# Patient Record
Sex: FEMALE | Race: BLACK OR AFRICAN AMERICAN | NOT HISPANIC OR LATINO | Employment: UNEMPLOYED | URBAN - METROPOLITAN AREA
[De-identification: names, ages, dates, MRNs, and addresses within clinical notes are randomized per-mention and may not be internally consistent; named-entity substitution may affect disease eponyms.]

---

## 2023-10-03 DIAGNOSIS — I10 CHRONIC HYPERTENSION: Primary | ICD-10-CM

## 2023-10-04 ENCOUNTER — PROCEDURE VISIT (OUTPATIENT)
Dept: MATERNAL FETAL MEDICINE | Facility: CLINIC | Age: 28
End: 2023-10-04
Payer: MEDICAID

## 2023-10-04 ENCOUNTER — OFFICE VISIT (OUTPATIENT)
Dept: MATERNAL FETAL MEDICINE | Facility: CLINIC | Age: 28
End: 2023-10-04
Payer: MEDICAID

## 2023-10-04 VITALS
HEART RATE: 91 BPM | SYSTOLIC BLOOD PRESSURE: 129 MMHG | HEIGHT: 65 IN | WEIGHT: 289 LBS | BODY MASS INDEX: 48.15 KG/M2 | DIASTOLIC BLOOD PRESSURE: 91 MMHG

## 2023-10-04 DIAGNOSIS — O28.3 ABNORMAL FETAL ULTRASOUND: ICD-10-CM

## 2023-10-04 DIAGNOSIS — O34.12 LEIOMYOMA OF UTERUS AFFECTING PREGNANCY IN SECOND TRIMESTER: ICD-10-CM

## 2023-10-04 DIAGNOSIS — O10.919 CHRONIC HYPERTENSION AFFECTING PREGNANCY: Primary | ICD-10-CM

## 2023-10-04 DIAGNOSIS — O09.90 AT HIGH RISK FOR COMPLICATIONS OF INTRAUTERINE PREGNANCY (IUP): ICD-10-CM

## 2023-10-04 DIAGNOSIS — D25.9 LEIOMYOMA OF UTERUS AFFECTING PREGNANCY IN SECOND TRIMESTER: ICD-10-CM

## 2023-10-04 DIAGNOSIS — O99.212 OBESITY AFFECTING PREGNANCY IN SECOND TRIMESTER, UNSPECIFIED OBESITY TYPE: ICD-10-CM

## 2023-10-04 DIAGNOSIS — O44.42 LOW LYING PLACENTA NOS OR WITHOUT HEMORRHAGE, SECOND TRIMESTER: ICD-10-CM

## 2023-10-04 DIAGNOSIS — I10 CHRONIC HYPERTENSION: ICD-10-CM

## 2023-10-04 DIAGNOSIS — O10.012 PRE-EXISTING ESSENTIAL HYPERTENSION AFFECTING PREGNANCY IN SECOND TRIMESTER: ICD-10-CM

## 2023-10-04 PROCEDURE — 3074F PR MOST RECENT SYSTOLIC BLOOD PRESSURE < 130 MM HG: ICD-10-PCS | Mod: CPTII,S$GLB,, | Performed by: OBSTETRICS & GYNECOLOGY

## 2023-10-04 PROCEDURE — 76811 OB US DETAILED SNGL FETUS: CPT | Mod: S$GLB,,, | Performed by: OBSTETRICS & GYNECOLOGY

## 2023-10-04 PROCEDURE — 3008F BODY MASS INDEX DOCD: CPT | Mod: CPTII,S$GLB,, | Performed by: OBSTETRICS & GYNECOLOGY

## 2023-10-04 PROCEDURE — 3080F DIAST BP >= 90 MM HG: CPT | Mod: CPTII,S$GLB,, | Performed by: OBSTETRICS & GYNECOLOGY

## 2023-10-04 PROCEDURE — 76817 PR US, OB, TRANSVAG APPROACH: ICD-10-PCS | Mod: S$GLB,,, | Performed by: OBSTETRICS & GYNECOLOGY

## 2023-10-04 PROCEDURE — 76811 PR US, OB FETAL EVAL & EXAM, TRANSABDOM,FIRST GESTATION: ICD-10-PCS | Mod: S$GLB,,, | Performed by: OBSTETRICS & GYNECOLOGY

## 2023-10-04 PROCEDURE — 1159F MED LIST DOCD IN RCRD: CPT | Mod: CPTII,S$GLB,, | Performed by: OBSTETRICS & GYNECOLOGY

## 2023-10-04 PROCEDURE — 76817 TRANSVAGINAL US OBSTETRIC: CPT | Mod: S$GLB,,, | Performed by: OBSTETRICS & GYNECOLOGY

## 2023-10-04 PROCEDURE — 3008F PR BODY MASS INDEX (BMI) DOCUMENTED: ICD-10-PCS | Mod: CPTII,S$GLB,, | Performed by: OBSTETRICS & GYNECOLOGY

## 2023-10-04 PROCEDURE — 1159F PR MEDICATION LIST DOCUMENTED IN MEDICAL RECORD: ICD-10-PCS | Mod: CPTII,S$GLB,, | Performed by: OBSTETRICS & GYNECOLOGY

## 2023-10-04 PROCEDURE — 3080F PR MOST RECENT DIASTOLIC BLOOD PRESSURE >= 90 MM HG: ICD-10-PCS | Mod: CPTII,S$GLB,, | Performed by: OBSTETRICS & GYNECOLOGY

## 2023-10-04 PROCEDURE — 99204 PR OFFICE/OUTPT VISIT, NEW, LEVL IV, 45-59 MIN: ICD-10-PCS | Mod: TH,25,S$GLB, | Performed by: OBSTETRICS & GYNECOLOGY

## 2023-10-04 PROCEDURE — 99204 OFFICE O/P NEW MOD 45 MIN: CPT | Mod: TH,25,S$GLB, | Performed by: OBSTETRICS & GYNECOLOGY

## 2023-10-04 PROCEDURE — 3074F SYST BP LT 130 MM HG: CPT | Mod: CPTII,S$GLB,, | Performed by: OBSTETRICS & GYNECOLOGY

## 2023-10-04 RX ORDER — NAPROXEN SODIUM 220 MG/1
81 TABLET, FILM COATED ORAL DAILY
COMMUNITY

## 2023-10-04 NOTE — PROGRESS NOTES
Maternal Fetal Medicine New Consult    Subjective     Patient ID: [id number]    Chief Complaint: MFM consult with US (CHTN and Obesity.  Patient is having headaches.)      HPI 28 y.o.  female  at 19w4d gestation with Estimated Date of Delivery: 24 by early US and unknown LMP. She is sent for MFM consultation for chronic hypertension, increased BMI.  She has chronic hypertension, diagnosed earlier in the pregnancy, and is currently on labetalol 200 mg twice daily.  She is on low-dose aspirin daily.  On 2023, she had 24 hour urine with 132 mg protein.  She has increased BMI of 48 at consult visit.  She denies any personal or family history of aneuploidy or anomalies. She denies any exposure to high fevers, viral rashes, illicit drugs or alcohol in this pregnancy.  She denies any leaking fluid, vaginal bleeding, contractions, decreased fetal movement. Denies headaches, visual disturbances, or epigastric pain.  She was accompanied by her mother, Lindsey Peterson.     Pregnancy complications include:   Patient Active Problem List   Diagnosis    Pre-existing essential hypertension affecting pregnancy in second trimester    Abnormal fetal ultrasound (absent nasal bone)    Leiomyoma of uterus affecting pregnancy in second trimester    Obesity affecting pregnancy in second trimester    Low lying placenta nos or without hemorrhage (possible previa), second trimester    At high risk for complications of intrauterine pregnancy (IUP)        Past Medical History:   Diagnosis Date    Hypertension        History reviewed. No pertinent surgical history.    Family History   Problem Relation Age of Onset    Hypertension Maternal Grandmother     Diabetes Maternal Grandmother     Breast cancer Maternal Grandmother     Stroke Maternal Grandfather     Hypertension Father     Diabetes Father     Miscarriages / Stillbirths Mother     Hypertension Mother     Diabetes Mother        Social History     Socioeconomic History     "Marital status: Single   Tobacco Use    Smoking status: Never     Passive exposure: Current    Smokeless tobacco: Never   Substance and Sexual Activity    Alcohol use: Not Currently    Drug use: Never    Sexual activity: Yes     Partners: Male       Current Outpatient Medications   Medication Sig Dispense Refill    aspirin 81 MG Chew Take 81 mg by mouth once daily.      labetalol HCl (INV LABETALOL) 200 MG tablet       prenatal vit/iron fum/folic ac (PRENATAL 1+1 ORAL) Take by mouth.       No current facility-administered medications for this visit.       Review of patient's allergies indicates:  No Known Allergies    Medications:  Current Outpatient Medications   Medication Instructions    aspirin 81 mg, Oral, Daily    labetalol HCl (INV LABETALOL) 200 MG tablet No dose, route, or frequency recorded.    prenatal vit/iron fum/folic ac (PRENATAL 1+1 ORAL) Oral       Review of Systems   12 point review of systems conducted, negative except as stated in the history of present illness. See HPI for details.      Objective     Visit Vitals  BP (!) 129/91 (BP Location: Left arm, Patient Position: Sitting, BP Method: X-Large (Automatic))   Pulse 91   Ht 5' 5" (1.651 m)   Wt 131.1 kg (289 lb)   BMI 48.09 kg/m²        Physical Exam  Vitals and nursing note reviewed. Exam conducted with a chaperone present.   Constitutional:       General: She is not in acute distress.     Appearance: Normal appearance.      Comments: Increased BMI   HENT:      Head: Normocephalic and atraumatic.      Nose: Nose normal. No congestion.      Mouth/Throat:      Pharynx: Oropharynx is clear.   Eyes:      General: No scleral icterus.     Pupils: Pupils are equal, round, and reactive to light.   Cardiovascular:      Rate and Rhythm: Normal rate and regular rhythm.   Pulmonary:      Effort: Pulmonary effort is normal.   Abdominal:      Palpations: Abdomen is soft.      Tenderness: There is no abdominal tenderness. There is no right CVA tenderness, " left CVA tenderness or guarding.      Comments: No CVA tenderness gravid uterus.    Musculoskeletal:         General: Normal range of motion.      Cervical back: Neck supple.      Right lower leg: No edema.      Left lower leg: No edema.   Skin:     General: Skin is warm.      Findings: No bruising or rash.   Neurological:      General: No focal deficit present.      Mental Status: She is oriented to person, place, and time.      Deep Tendon Reflexes: Reflexes normal.      Comments: Normal reflexes   Psychiatric:         Mood and Affect: Mood normal.         Behavior: Behavior normal.         Thought Content: Thought content normal.         Judgment: Judgment normal.         ASSESSMENT/PLAN:     28 y.o.  female with IUP at 19w4d    Chronic hypertension in pregnancy, 2nd trimester  Chronic hypertension complicates up to 2-5% of pregnancies and is associated with significant adverse pregnancy outcomes including  birth, fetal growth restriction, fetal demise, placental abruption, and  delivery. Recent data suggest higher risk of certain congenital anomalies, including, heart defects, hypospadias and esophageal atresia. The incidence of adverse outcomes seen in pregnancies complicated by chronic hypertension is related to the degree and duration of hypertension and to the association of other organ system involvement or damage. Most pregnant women with mild chronic hypertension have uneventful pregnancies with no end-organ involvement. Comparing women who developed superimposed preeclampsia with women who did not, the incidence of  birth was 100% versus 38%, the incidence of small-for-gestational-age (SGA) infants was 78% versus 15%, and the  mortality rate was 48% versus 0%. Besides superimposed preeclampsia or eclampsia, pregnancy complicated by chronic hypertension (especially if severe) may be associated with worsening or malignant hypertension, central nervous system hemorrhage,  "cardiac decompensation, and renal deterioration or failure.    The age of onset, results of previous evaluation, severity and duration of hypertension, and physical examination are important determinants of which clinical tests may be useful. Ideally, a woman with chronic hypertension should be evaluated before pregnancy to ascertain potentially reversible causes and end-organ involvement (eg, heart or kidney). Baseline laboratory tests may include serum creatinine, blood urea nitrogen, electrolytes, CBC and 24-hour urine evaluation for total protein and creatinine clearance. Women who have had hypertension for several years are more likely to have cardiomegaly, ischemic heart disease, renal involvement, and retinopathy. In patients with severe disease over 4 years, or long standing hypertension (typically if over 30 years old), tests which may prove useful in the evaluation of these women include electrocardiography, echocardiography, ophthalmologic examination, and renal ultrasonography.     Women with paroxysmal hypertension, frequent "hypertensive crisis," seizure disorders, or anxiety attacks should be evaluated for pheochromocytoma with measurements of 24-hour urine vanillylmandelic acid, metanephrines, or unconjugated catecholamines. Magnetic resonance imaging after the first trimester or computed tomography may be useful for adrenal tumor localization. Renal artery stenosis appears to be more prevalent in patients with type-2 diabetes and coexistent hypertension. Doppler flow studies or magnetic resonance angiography are helpful to detect renal artery stenosis. Negative results from renal ultrasonography do not exclude renal artery stenosis.     In women with chronic hypertension, it can be difficult to discern worsening hypertension that might occur as a result of physiological changes of pregnancy in the third trimester from the development of preeclampsia. The use of certain laboratory tests such as serum " creatinine, liver functions tests, hematocrit and platelets to compare to baseline values from early in pregnancy might serve to delineate these conditions. Routine screening of women with chronic hypertension with these laboratory tests is not routinely indicated.    I have shared with her the normal natural course of chronic hypertension in pregnancy with improvement early on and likely increasing blood pressure as the pregnancy advances. Based on findings of recent CHAP Study, it is recommended to utilize 140/90 as the threshold for initiation or titration of medical therapy for chronic hypertension in pregnancy, rather than the previously recommended threshold of 160/110. For patients on blood pressure medications at the start of pregnancy, in the absence of mitigating factors or side effects, they can be maintained on their medications, rather than discontinuing them and waiting to initiate treatment for blood pressures in the severe range. Commonly used medications include nifedipine and labetalol.    BP Readings from Last 1 Encounters:   10/04/23 (!) 129/91     With this blood pressure, she was advised to continue low salt diet, and continue labetalol at 200 mg twice daily .  She is also to follow a low sodium diet avoiding any excessive weight gain.     Use aspirin as discussed.    She was advised of the higher risk of fetal growth restriction and superimposed preeclampsia with her underlying chronic hypertension. The risk of placental abruption was also discussed. No prevention is available with her reporting any bleeding or cramping. She was also advised to report any headaches, visual problems, epigastric pain.    There is no consensus as to the most appropriate fetal surveillance test(s) or the interval and timing of testing in  with chronic hypertension. Thus, such testing should be individualized and based on clinical judgment and on severity of disease.    We will plan to do follow-up  ultrasounds to monitor growth in 6 weeks and then every 4 weeks until the end of pregnancy. Recommend fetal testing starting around 32 weeks gestation until the end of pregnancy.     Among patients with uncomplicated chronic hypertension with no additional risk factors, delivery at 38-39 weeks appears to provide optimal balance between the risk of adverse fetal and  outcomes. If no medication and normal fetal assessment, recommend delivery at 39 weeks. However, will reassess closer to EDC to determine optimal timeframe or GA for delivery, based on current evaluation at that time.      Increased BMI in pregnancy, 2nd trimester  Body mass index is 48.09 kg/m².    I discussed with her the risk of first trimester miscarriage, recurrent miscarriages, congenital anomalies, hypertensive disorders, gestational diabetes,  delivery (BMI>35), macrosomia, higher risk of fetal demise in-utero and higher risk of . She was advised of the importance of eating healthy and and limiting weight gain to 11-15 lbs during the pregnancy, as optimal in this situation.  I recommended low calorie, low fat diet avoiding any additional excessive weight gain.  Excess weight gain would be associated with gestational hypertension, gestational diabetes and adverse  outcomes, including fetal demise in utero.    Low dose aspirin as discussed.    It is important to do fetal kick counts to be done on a daily basis after 24 weeks until delivery. With elevated BMI, consider weekly fetal testing until delivery. Earlier fetal testing is needed if any additional risk factors like diabetes, hypertension or even excessive fetal growth.    Preoperative antibiotics and thromboprophylaxis with use if pneumatic compression devices is recommend in those undergoing  delivery. Thromboprophylaxis should also be use with those on prolonged immobilization.    Discussed the importance of losing weight after this pregnancy,  especially before attempting future pregnancy. Breastfeeding may be an important tool in reducing the postpartum weight retention. Fetal risks were discussed with short term risk of fetal/ obesity and long term risk of adolescent component of metabolic syndrome.    Follow a healthy low caloric diet      Leiomyoma in pregnancy, 2nd trimester  Right intramural fibroid measuring 7.6 cm in diameter    Risks of fibroids include  delivery,  PROM, abnormal presentations, higher risk of  sections, placenta previa, fibroid degeneration, poor fetal growth, oligohydramnios, vaginal bleeding and postpartum hemorrhage and in rare circumstances obstruction of lower uterus/cervix, impeding vaginal delivery    Recommend fetal growth ultrasound at 32 weeks (if no other risk factors) for patients with a single large fibroid or multiple fibroids. If concern for degenerating fibroids, can administer short course of NSAID (48-72hrs of Indocin) prior to 32 weeks gestation.     She was instructed to report any increased pain, cramps, vaginal discharge or bleeding. Emphasized the importance of monitoring fetal kick count activity, and reporting immediately if decreased fetal movement occurs.    Document postpartum hemorrhage risk on admission to hospital, ensure T&S sent, and consider type and crossmatch depending on postpartum hemorrhage risk      Abnormal ultrasound, absent nasal bone  Discussed the significance of this finding and higher risk for Down syndrome about 8-fold higher than baseline risk based on maternal age. Patient was offered genetic amniocentesis, after thorough counseling on benefits and risks of procedure, with very remote risk of complications and in some studies no identifiable increased risk, above background risk of spontaneous miscarriage, while some current data suggests risk up to 1 in 800 with early amniocentesis prior to 24 weeks, and remote risk of need for emergency delivery  with all the complications of prematurity with amniocentesis after 24 weeks. She declined amniocentesis . She is aware of the small risk of aneuploidy and need for  evaluation.  Additionally, the limitation of ultrasound in checking for anomalies and the need for  evaluation was emphasized.    She was counseled on Cell free DNA understanding that it is an enhanced screening test but not a diagnostic test. It assesses risk for common aneuploidies such as Trisomy 13, 18, and 21 by evaluating cell-free fetal DNA in maternal circulation with a false positive rate less than 0.5% for Trisomy 21 and less reliable for Trisomy 13 and Trisomy 18. She already had negative cell free DNA.    Patient was advised that in the absence of aneuploidy absent nasal bone could be a familial issue with depressed nasal bridge      At high risk for preeclampsia  With her risk factors for preeclampsia including chronic hypertension , nulliparity, BMI over 30, mother with preeclampsia,  ancestry, and low socioeconomic status, discussed recommendations for low dose aspirin use to decrease risks for adverse outcomes, including preeclampsia, low birth rate and  delivery. Low-dose aspirin reduced the risk for preeclampsia by 24% in clinical trials and reduced the risk for  birth by 14% and FGR by 20%.  After discussing risks/benefits of its use, it was agreed to adjust to asa 81 mg BID, due to multiple risk factors for preeclampsia. After discussing benefits (more effective than daily) vs potential risks (less data on safety with use at delivery), and continue until 34 6/7weeks, then decrease to once daily until delivery.. Also, recommend using in all future pregnancies.      Low lying placenta  Possible low lying placenta versus placenta previa; uncertain due to lower uterine segment contraction.    I discussed with her that most placentas in this situation will move upward as the pregnancy advances and would  not pose problem during the pregnancy. However, advised to be on pelvic rest till we see upward migration of the placenta and expectant management needs to be done. Advised her to report any vaginal bleeding.     Follow up in about 6 weeks (around 11/15/2023) for M follow-up, Repeat ultrasound.     Future Appointments   Date Time Provider Department Center   11/15/2023  8:30 AM Samuel Blackman MD [facility]   11/15/2023  8:30 AM ROOM 1 AND 2, [facility]        Patient was evaluated and examined by Dr. Blackman. ARMINDA Lakhani, helped as  scribe in completion of part of note.    Components of this note were documented using voice recognition systems; and are subject to errors not corrected at proof reading.  Please contact the author for any clarifications.

## 2023-11-14 DIAGNOSIS — O10.919 CHRONIC HYPERTENSION AFFECTING PREGNANCY: Primary | ICD-10-CM

## 2023-11-15 ENCOUNTER — OFFICE VISIT (OUTPATIENT)
Dept: MATERNAL FETAL MEDICINE | Facility: CLINIC | Age: 28
End: 2023-11-15
Payer: MEDICAID

## 2023-11-15 ENCOUNTER — PROCEDURE VISIT (OUTPATIENT)
Dept: MATERNAL FETAL MEDICINE | Facility: CLINIC | Age: 28
End: 2023-11-15
Payer: MEDICAID

## 2023-11-15 VITALS
BODY MASS INDEX: 47.09 KG/M2 | HEART RATE: 99 BPM | HEIGHT: 65 IN | SYSTOLIC BLOOD PRESSURE: 138 MMHG | WEIGHT: 282.63 LBS | DIASTOLIC BLOOD PRESSURE: 84 MMHG

## 2023-11-15 DIAGNOSIS — O99.212 OBESITY AFFECTING PREGNANCY IN SECOND TRIMESTER, UNSPECIFIED OBESITY TYPE: ICD-10-CM

## 2023-11-15 DIAGNOSIS — O10.012 PRE-EXISTING ESSENTIAL HYPERTENSION AFFECTING PREGNANCY IN SECOND TRIMESTER: ICD-10-CM

## 2023-11-15 DIAGNOSIS — O34.12 LEIOMYOMA OF UTERUS AFFECTING PREGNANCY IN SECOND TRIMESTER: ICD-10-CM

## 2023-11-15 DIAGNOSIS — O44.42 LOW LYING PLACENTA NOS OR WITHOUT HEMORRHAGE, SECOND TRIMESTER: ICD-10-CM

## 2023-11-15 DIAGNOSIS — D25.9 LEIOMYOMA OF UTERUS AFFECTING PREGNANCY IN SECOND TRIMESTER: ICD-10-CM

## 2023-11-15 DIAGNOSIS — O09.90 AT HIGH RISK FOR COMPLICATIONS OF INTRAUTERINE PREGNANCY (IUP): ICD-10-CM

## 2023-11-15 DIAGNOSIS — O28.3 ABNORMAL FETAL ULTRASOUND: Primary | ICD-10-CM

## 2023-11-15 DIAGNOSIS — O10.919 CHRONIC HYPERTENSION AFFECTING PREGNANCY: ICD-10-CM

## 2023-11-15 PROCEDURE — 3008F BODY MASS INDEX DOCD: CPT | Mod: CPTII,S$GLB,, | Performed by: OBSTETRICS & GYNECOLOGY

## 2023-11-15 PROCEDURE — 99214 OFFICE O/P EST MOD 30 MIN: CPT | Mod: TH,S$GLB,, | Performed by: OBSTETRICS & GYNECOLOGY

## 2023-11-15 PROCEDURE — 1159F PR MEDICATION LIST DOCUMENTED IN MEDICAL RECORD: ICD-10-PCS | Mod: CPTII,S$GLB,, | Performed by: OBSTETRICS & GYNECOLOGY

## 2023-11-15 PROCEDURE — 1159F MED LIST DOCD IN RCRD: CPT | Mod: CPTII,S$GLB,, | Performed by: OBSTETRICS & GYNECOLOGY

## 2023-11-15 PROCEDURE — 3075F SYST BP GE 130 - 139MM HG: CPT | Mod: CPTII,S$GLB,, | Performed by: OBSTETRICS & GYNECOLOGY

## 2023-11-15 PROCEDURE — 3079F DIAST BP 80-89 MM HG: CPT | Mod: CPTII,S$GLB,, | Performed by: OBSTETRICS & GYNECOLOGY

## 2023-11-15 PROCEDURE — 76816 OB US FOLLOW-UP PER FETUS: CPT | Mod: S$GLB,,, | Performed by: OBSTETRICS & GYNECOLOGY

## 2023-11-15 PROCEDURE — 76817 PR US, OB, TRANSVAG APPROACH: ICD-10-PCS | Mod: S$GLB,,, | Performed by: OBSTETRICS & GYNECOLOGY

## 2023-11-15 PROCEDURE — 99214 PR OFFICE/OUTPT VISIT, EST, LEVL IV, 30-39 MIN: ICD-10-PCS | Mod: TH,S$GLB,, | Performed by: OBSTETRICS & GYNECOLOGY

## 2023-11-15 PROCEDURE — 3079F PR MOST RECENT DIASTOLIC BLOOD PRESSURE 80-89 MM HG: ICD-10-PCS | Mod: CPTII,S$GLB,, | Performed by: OBSTETRICS & GYNECOLOGY

## 2023-11-15 PROCEDURE — 3008F PR BODY MASS INDEX (BMI) DOCUMENTED: ICD-10-PCS | Mod: CPTII,S$GLB,, | Performed by: OBSTETRICS & GYNECOLOGY

## 2023-11-15 PROCEDURE — 76817 TRANSVAGINAL US OBSTETRIC: CPT | Mod: S$GLB,,, | Performed by: OBSTETRICS & GYNECOLOGY

## 2023-11-15 PROCEDURE — 3075F PR MOST RECENT SYSTOLIC BLOOD PRESS GE 130-139MM HG: ICD-10-PCS | Mod: CPTII,S$GLB,, | Performed by: OBSTETRICS & GYNECOLOGY

## 2023-11-15 PROCEDURE — 76816 PR  US,PREGNANT UTERUS,F/U,TRANSABD APP: ICD-10-PCS | Mod: S$GLB,,, | Performed by: OBSTETRICS & GYNECOLOGY

## 2023-11-15 RX ORDER — ALBUTEROL SULFATE 90 UG/1
1 AEROSOL, METERED RESPIRATORY (INHALATION) EVERY 4 HOURS
COMMUNITY
Start: 2023-11-07

## 2023-11-15 RX ORDER — ONDANSETRON 4 MG/1
4 TABLET, FILM COATED ORAL EVERY 6 HOURS PRN
COMMUNITY
Start: 2023-08-14

## 2023-11-15 RX ORDER — ALBUTEROL SULFATE 0.83 MG/ML
SOLUTION RESPIRATORY (INHALATION)
COMMUNITY
Start: 2023-11-07

## 2023-11-15 RX ORDER — LABETALOL 200 MG/1
200 TABLET, FILM COATED ORAL 3 TIMES DAILY
COMMUNITY
Start: 2023-10-26 | End: 2024-01-08 | Stop reason: SDUPTHER

## 2023-11-15 NOTE — PROGRESS NOTES
Maternal Fetal Medicine Follow Up Consult    Subjective     Patient ID: [id number]    Chief Complaint: mfm followup w/us      HPI: Pari Peterson is a 28 y.o. female  at 25w4d gestation with Estimated Date of Delivery: 24  who is here for follow  up consultation by M.  She has chronic hypertension, diagnosed earlier in the pregnancy, and is currently on labetalol 200 mg q12 hours.  She is on low-dose aspirin daily.  On 2023, she had 24 hour urine with 132 mg protein.  She has increased BMI of 48 at consult visit.  She had absent fetal nasal bone seen on consult ultrasound and had negative cell free DNA.  She has fibroid uterus.         Interval history since last M visit: None.. She denies any leaking fluid, vaginal bleeding, contractions, decreased fetal movement. Denies headaches, visual disturbances, or epigastric pain.    Pregnancy complications include:   Patient Active Problem List   Diagnosis    Pre-existing essential hypertension affecting pregnancy in second trimester    Abnormal fetal ultrasound (absent nasal bone)    Leiomyoma of uterus affecting pregnancy in second trimester    Obesity affecting pregnancy in second trimester    Low lying placenta nos or without hemorrhage (possible previa), second trimester    At high risk for complications of intrauterine pregnancy (IUP)        No changes to medical, surgical, family, social, or obstetric history.    Medications:  Current Outpatient Medications   Medication Instructions    albuterol (PROVENTIL) 2.5 mg /3 mL (0.083 %) nebulizer solution SMARTSI Vial(s) Via Nebulizer Every 8 Hours PRN    albuterol (PROVENTIL/VENTOLIN HFA) 90 mcg/actuation inhaler 1 puff, Every 4 hours    aspirin 81 mg, Oral, Daily    labetaloL (NORMODYNE) 200 mg, Oral, 2 times daily    labetalol HCl (INV LABETALOL) 200 MG tablet No dose, route, or frequency recorded.    ondansetron (ZOFRAN) 4 mg, Oral, Every 6 hours PRN    prenatal vit/iron fum/folic ac (PRENATAL 1+1  "ORAL) Oral       Review of Systems   12 point review of systems conducted, negative except as stated in the history of present illness. See HPI for details.      Objective     Visit Vitals  /84 (BP Location: Left arm, Patient Position: Sitting, BP Method: Large (Manual))   Pulse 99   Ht 5' 5" (1.651 m)   Wt 128.2 kg (282 lb 9.6 oz)   BMI 47.03 kg/m²        Physical Exam  Vitals and nursing note reviewed.   Constitutional:       Appearance: Normal appearance.      Comments: Increased BMI   HENT:      Head: Normocephalic and atraumatic.      Nose: Nose normal. No congestion.   Cardiovascular:      Rate and Rhythm: Normal rate.   Pulmonary:      Effort: Pulmonary effort is normal.   Skin:     Findings: No rash.   Neurological:      Mental Status: She is alert and oriented to person, place, and time.   Psychiatric:         Mood and Affect: Mood normal.         Behavior: Behavior normal.         Thought Content: Thought content normal.         Judgment: Judgment normal.           ASSESSMENT/PLAN:     28 y.o.  female with IUP at 25w4d    Chronic hypertension in pregnancy  There is normal fetal growth with an EFW of 749 g at the 31% and the AC at the 19% on 11/15/2023 with normal amniotic fluid volume.    Chronic hypertension complicates up to 2-5% of pregnancies and is associated with significant adverse pregnancy outcomes including  birth, fetal growth restriction, fetal demise, placental abruption, and  delivery.    BP Readings from Last 1 Encounters:   11/15/23 138/84     With this BP, and another reading that was 142/80, she was advised to continue low salt diet, and continue labetalol at 200 mg q12 hours .     Low dose aspirin as discussed.    She would benefit from follow-up ultrasounds to monitor growth with next us in 5 weeks and then every 4 weeks with fetal testing starting around 32 weeks gestation until the end of pregnancy.     Among patients with uncomplicated chronic hypertension " with no additional risk factors, delivery at 38-39 weeks appears to provide optimal balance between the risk of adverse fetal and  outcomes. If no medication and normal fetal assessment, recommend delivery at 39 weeks. However, will reassess closer to EDC to determine optimal timeframe or GA for delivery, based on current evaluation at that time.       Increased BMI in pregnancy, 2nd trimester  Body mass index is 47.03 kg/m². With  7 lb lost since last visit, she was advised to avoid further loss..  Excess weight gain would be associated with gestational hypertension, gestational diabetes and adverse  outcomes, including fetal demise in utero.  Continued weight loss could increase the risk of adverse outcomes.    With risk factors associated with increased BMI, she is to do fetal kick counts throughout the pregnancy (after 24 weeks).    It is important to lose weight after the pregnancy is over, especially before a future pregnancy was discussed. Breastfeeding may be an important tool in reducing the postpartum weight retention. Fetal risks were discussed with short term risk of fetal/ obesity and long term risk of adolescent component of metabolic syndrome.     Leiomyoma in pregnancy  Right intramural fibroid measuring 7.6 cm in diameter on 11/15/2023    Risks of fibroids include  delivery,  PROM, abnormal presentations, higher risk of  sections, placenta previa, fibroid degeneration, poor fetal growth, oligohydramnios, vaginal bleeding and postpartum hemorrhage and in rare circumstances obstruction of lower uterus/cervix, impeding vaginal delivery    Recommend fetal growth ultrasound at 32 weeks (if no other risk factors) for patients with a single large fibroid or multiple fibroids. If concern for degenerating fibroids, can administer short course of NSAID (48-72hrs of Indocin) prior to 32 weeks gestation.     She was instructed to report any increased pain, cramps,  vaginal discharge or bleeding. Emphasized the importance of monitoring fetal kick count activity, and reporting immediately if decreased fetal movement occurs.    Document postpartum hemorrhage risk on admission to hospital, ensure T&S sent, and consider type and crossmatch depending on postpartum hemorrhage risk      Abnormal ultrasound, absent nasal bone  She already had negative cell free DNA. She declined amniocentesis . She is aware of the small risk of aneuploidy and need for  evaluation.  Additionally, the limitation of ultrasound in checking for anomalies and the need for  evaluation was emphasized.    Patient was advised that in the absence of aneuploidy absent nasal bone could be a familial issue with depressed nasal bridge.      At high risk for preeclampsia  With her increased risk for preeclampsia, she agreed to continue asa 81 mg BID until 34 6/7 weeks, then decrease to once daily until delivery.. Preeclampsia precautions reviewed.      Low lying placenta placenta previously seen not seen on 11/15/2023  There is no evidence of previa or low-lying placenta at this time with lower edge of the placenta 2 cm from internal os with no evidence of vasa previa.  Expectant management.           Follow up in about 5 weeks (around 2023) for Repeat  ultrasound, MFM follow-up, BPP.     No future appointments.       MARY involvement: Patient was evaluated and examined by Dr. Blackman. ARMINDA Lakhani, helped in pre charting of part of note.    Components of this note were documented using voice recognition systems and are subject to errors not corrected at proofreading. Please contact the author for any clarifications.

## 2023-12-19 DIAGNOSIS — O99.212 OBESITY AFFECTING PREGNANCY IN SECOND TRIMESTER, UNSPECIFIED OBESITY TYPE: Primary | ICD-10-CM

## 2023-12-20 ENCOUNTER — OFFICE VISIT (OUTPATIENT)
Dept: MATERNAL FETAL MEDICINE | Facility: CLINIC | Age: 28
End: 2023-12-20
Payer: MEDICAID

## 2023-12-20 ENCOUNTER — PROCEDURE VISIT (OUTPATIENT)
Dept: MATERNAL FETAL MEDICINE | Facility: CLINIC | Age: 28
End: 2023-12-20
Payer: MEDICAID

## 2023-12-20 VITALS
SYSTOLIC BLOOD PRESSURE: 140 MMHG | DIASTOLIC BLOOD PRESSURE: 86 MMHG | HEART RATE: 97 BPM | WEIGHT: 288.19 LBS | HEIGHT: 65 IN | BODY MASS INDEX: 48.01 KG/M2

## 2023-12-20 DIAGNOSIS — O10.013 PRE-EXISTING ESSENTIAL HYPERTENSION AFFECTING PREGNANCY IN THIRD TRIMESTER: ICD-10-CM

## 2023-12-20 DIAGNOSIS — O99.213 OBESITY AFFECTING PREGNANCY IN THIRD TRIMESTER, UNSPECIFIED OBESITY TYPE: ICD-10-CM

## 2023-12-20 DIAGNOSIS — O09.90 AT HIGH RISK FOR COMPLICATIONS OF INTRAUTERINE PREGNANCY (IUP): Primary | ICD-10-CM

## 2023-12-20 DIAGNOSIS — O44.42 LOW LYING PLACENTA NOS OR WITHOUT HEMORRHAGE, SECOND TRIMESTER: ICD-10-CM

## 2023-12-20 DIAGNOSIS — D25.9 LEIOMYOMA OF UTERUS AFFECTING PREGNANCY IN THIRD TRIMESTER: ICD-10-CM

## 2023-12-20 DIAGNOSIS — O36.5931 LIGHT FOR GESTATIONAL DATES AFFECTING MANAGEMENT OF MOTHER, THIRD TRIMESTER, FETUS 1: ICD-10-CM

## 2023-12-20 DIAGNOSIS — O99.212 OBESITY AFFECTING PREGNANCY IN SECOND TRIMESTER, UNSPECIFIED OBESITY TYPE: ICD-10-CM

## 2023-12-20 DIAGNOSIS — O34.13 LEIOMYOMA OF UTERUS AFFECTING PREGNANCY IN THIRD TRIMESTER: ICD-10-CM

## 2023-12-20 DIAGNOSIS — O28.3 ABNORMAL FETAL ULTRASOUND: ICD-10-CM

## 2023-12-20 PROCEDURE — 99214 PR OFFICE/OUTPT VISIT, EST, LEVL IV, 30-39 MIN: ICD-10-PCS | Mod: TH,S$GLB,, | Performed by: OBSTETRICS & GYNECOLOGY

## 2023-12-20 PROCEDURE — 76816 PR  US,PREGNANT UTERUS,F/U,TRANSABD APP: ICD-10-PCS | Mod: S$GLB,,, | Performed by: OBSTETRICS & GYNECOLOGY

## 2023-12-20 PROCEDURE — 3077F SYST BP >= 140 MM HG: CPT | Mod: CPTII,S$GLB,, | Performed by: OBSTETRICS & GYNECOLOGY

## 2023-12-20 PROCEDURE — 76820 UMBILICAL ARTERY ECHO: CPT | Mod: S$GLB,,, | Performed by: OBSTETRICS & GYNECOLOGY

## 2023-12-20 PROCEDURE — 3077F PR MOST RECENT SYSTOLIC BLOOD PRESSURE >= 140 MM HG: ICD-10-PCS | Mod: CPTII,S$GLB,, | Performed by: OBSTETRICS & GYNECOLOGY

## 2023-12-20 PROCEDURE — 3008F PR BODY MASS INDEX (BMI) DOCUMENTED: ICD-10-PCS | Mod: CPTII,S$GLB,, | Performed by: OBSTETRICS & GYNECOLOGY

## 2023-12-20 PROCEDURE — 76816 OB US FOLLOW-UP PER FETUS: CPT | Mod: S$GLB,,, | Performed by: OBSTETRICS & GYNECOLOGY

## 2023-12-20 PROCEDURE — 1159F MED LIST DOCD IN RCRD: CPT | Mod: CPTII,S$GLB,, | Performed by: OBSTETRICS & GYNECOLOGY

## 2023-12-20 PROCEDURE — 3008F BODY MASS INDEX DOCD: CPT | Mod: CPTII,S$GLB,, | Performed by: OBSTETRICS & GYNECOLOGY

## 2023-12-20 PROCEDURE — 76819 PR US, OB, FETAL BIOPHYSICAL, W/O NST: ICD-10-PCS | Mod: S$GLB,,, | Performed by: OBSTETRICS & GYNECOLOGY

## 2023-12-20 PROCEDURE — 99214 OFFICE O/P EST MOD 30 MIN: CPT | Mod: TH,S$GLB,, | Performed by: OBSTETRICS & GYNECOLOGY

## 2023-12-20 PROCEDURE — 3079F DIAST BP 80-89 MM HG: CPT | Mod: CPTII,S$GLB,, | Performed by: OBSTETRICS & GYNECOLOGY

## 2023-12-20 PROCEDURE — 1159F PR MEDICATION LIST DOCUMENTED IN MEDICAL RECORD: ICD-10-PCS | Mod: CPTII,S$GLB,, | Performed by: OBSTETRICS & GYNECOLOGY

## 2023-12-20 PROCEDURE — 76819 FETAL BIOPHYS PROFIL W/O NST: CPT | Mod: S$GLB,,, | Performed by: OBSTETRICS & GYNECOLOGY

## 2023-12-20 PROCEDURE — 3079F PR MOST RECENT DIASTOLIC BLOOD PRESSURE 80-89 MM HG: ICD-10-PCS | Mod: CPTII,S$GLB,, | Performed by: OBSTETRICS & GYNECOLOGY

## 2023-12-20 PROCEDURE — 76820 PR US, OB DOPPLER, FETAL UMBILICAL ARTERY ECHO: ICD-10-PCS | Mod: S$GLB,,, | Performed by: OBSTETRICS & GYNECOLOGY

## 2023-12-20 RX ORDER — FERROUS SULFATE TAB 325 MG (65 MG ELEMENTAL FE) 325 (65 FE) MG
TAB ORAL 2 TIMES DAILY
COMMUNITY
Start: 2023-11-28

## 2023-12-20 NOTE — PROGRESS NOTES
Maternal Fetal Medicine Follow Up    Subjective     Patient ID: [id number]    Chief Complaint: MFM FOLLOWUP W/US      HPI: Pari Peterson is a 28 y.o. female  at 30w4d gestation with Estimated Date of Delivery: 24  who is here for follow  up consultation by MFM.    She has chronic hypertension, diagnosed earlier in the pregnancy, and is currently on labetalol 200 mg q12 hours.  She is on low-dose aspirin daily.  On 2023, she had 24 hour urine with 132 mg protein.  She has increased BMI of 48 at consult visit.  She had absent fetal nasal bone seen on consult ultrasound and had negative cell free DNA.  She has fibroid uterus.  She had low-lying placenta seen on outside ultrasound, not seen on follow-up and vasa previa was ruled out.       Interval history since last MFM visit: None.. She denies any leaking fluid, vaginal bleeding, contractions, decreased fetal movement. Denies headaches, visual disturbances, or epigastric pain.    Pregnancy complications include:   Patient Active Problem List   Diagnosis    Pre-existing essential hypertension affecting pregnancy in third trimester    Abnormal fetal ultrasound (absent nasal bone)    Leiomyoma of uterus affecting pregnancy in third trimester    Increased BMI affecting pregnancy in third trimester    At high risk for complications of intrauterine pregnancy (IUP)    Light for gestational dates (EFW 17% on 2023) affecting management of mother, third trimester, fetus 1        No changes to medical, surgical, family, social, or obstetric history.    Medications:  Current Outpatient Medications   Medication Instructions    albuterol (PROVENTIL) 2.5 mg /3 mL (0.083 %) nebulizer solution SMARTSI Vial(s) Via Nebulizer Every 8 Hours PRN    albuterol (PROVENTIL/VENTOLIN HFA) 90 mcg/actuation inhaler 1 puff, Every 4 hours    aspirin 81 mg, Oral, Daily    FEROSUL 325 mg (65 mg iron) Tab tablet Oral, 2 times daily    labetaloL (NORMODYNE) 200 mg, Oral, 2 times  "daily    labetalol HCl (INV LABETALOL) 200 MG tablet No dose, route, or frequency recorded.    ondansetron (ZOFRAN) 4 mg, Oral, Every 6 hours PRN    prenatal vit/iron fum/folic ac (PRENATAL 1+1 ORAL) Oral       Review of Systems   12 point review of systems conducted, negative except as stated in the history of present illness. See HPI for details.      Objective     Visit Vitals  BP (!) 140/86 (BP Location: Right arm, Patient Position: Sitting, BP Method: Large (Manual))   Pulse 97   Ht 5' 5" (1.651 m)   Wt 130.7 kg (288 lb 3.2 oz)   BMI 47.96 kg/m²        Physical Exam  Vitals and nursing note reviewed.   Constitutional:       Appearance: Normal appearance.      Comments: Increased BMI   HENT:      Head: Normocephalic and atraumatic.      Nose: Nose normal. No congestion.   Cardiovascular:      Rate and Rhythm: Normal rate.   Pulmonary:      Effort: Pulmonary effort is normal.   Skin:     Findings: No rash.   Neurological:      Mental Status: She is alert and oriented to person, place, and time.   Psychiatric:         Mood and Affect: Mood normal.         Behavior: Behavior normal.         Thought Content: Thought content normal.         Judgment: Judgment normal.           ASSESSMENT/PLAN:     28 y.o.  female with IUP at 30w4d    Chronic hypertension in pregnancy with low-normal fetal growth  There is low normal fetal growth with an EFW of 1350 g at the 17% and the AC at the 19%. A limited repeat fetal anatomic survey shows no abnormalities of the structures that were adequately imaged.   AFV is normal.     The BPP score is reassuring at 8/8, and the MVP is normal.    with estimated fetal weight at 17th percentile with possible more significant fetal growth restriction in view of range of error of ultrasound, umbilical artery Doppler was done and was normal with S/D ratio of 2.85    Chronic hypertension complicates up to 2-5% of pregnancies and is associated with significant adverse pregnancy outcomes " including  birth, fetal growth restriction, fetal demise, placental abruption, and  delivery.    BP Readings from Last 1 Encounters:   23 (!) 140/86     With this BP and another reading at 158/100,, she was advised to continue low salt diet, and adjust labetalol to 200 mg q.8 hours .     Low dose aspirin as discussed.    With low-normal fetal growth, we will plan to recheck growth in 3 weeks and start twice weekly fetal testing with chronic hypertension on medication and BMI over 45.  Discussed with Dr. Bolviar who will be seeing the patient early in the week for an NST and will see her later in the week on .        Increased BMI in pregnancy  Body mass index is 47.96 kg/m². With relatively stable weight recently, she was advised to continue healthy low caloric and low salt diet.  Excess weight gain would be associated with gestational hypertension, gestational diabetes and adverse  outcomes, including fetal demise in utero.    With risk factors associated with increased BMI, she is to do fetal kick counts throughout the pregnancy (after 24 weeks).    It is important to lose weight after the pregnancy is over, especially before a future pregnancy was discussed. Breastfeeding may be an important tool in reducing the postpartum weight retention. Fetal risks were discussed with short term risk of fetal/ obesity and long term risk of adolescent component of metabolic syndrome.       Leiomyoma in pregnancy  Right intramural fibroid measuring  6.9 cm in diameter on 2023    Risks of fibroids include  delivery,  PROM, abnormal presentations, higher risk of  sections, placenta previa, fibroid degeneration, poor fetal growth, oligohydramnios, vaginal bleeding and postpartum hemorrhage and in rare circumstances obstruction of lower uterus/cervix, impeding vaginal delivery    If concern for degenerating fibroids, can administer short course of NSAID  (48-72hrs of Indocin) prior to 32 weeks gestation.     She was instructed to report any increased pain, cramps, vaginal discharge or bleeding. Emphasized the importance of monitoring fetal kick count activity, and reporting immediately if decreased fetal movement occurs.    Document postpartum hemorrhage risk on admission to hospital, ensure T&S sent, and consider type and crossmatch depending on postpartum hemorrhage risk      Absent nasal bone  She already had negative cell free DNA. She declined amniocentesis . She is aware of the small risk of aneuploidy and need for  evaluation.  Additionally, the limitation of ultrasound in checking for anomalies and the need for  evaluation was emphasized.    Patient was advised that in the absence of aneuploidy absent nasal bone could be a familial issue with depressed nasal bridge.      At high risk for preeclampsia  With her increased risk for preeclampsia, she agreed to continue asa 81 mg BID until 34 6/7 weeks, then decrease to once daily until delivery.. Preeclampsia precautions reviewed.      No orders of the defined types were placed in this encounter.       Follow up in about 1 week (around 2023) for Thursday, BPP, MFM follow-up.     No future appointments.       MARY involvement: Patient was evaluated by ARMINDA Lakhani and Dr. Blackman.  Final assessment and recommendations as stated above were made by Dr. Blackman.    Components of this note were documented using voice recognition systems and are subject to errors not corrected at proofreading. Please contact the author for any clarifications.

## 2023-12-21 DIAGNOSIS — O34.13 LEIOMYOMA OF UTERUS AFFECTING PREGNANCY IN THIRD TRIMESTER: ICD-10-CM

## 2023-12-21 DIAGNOSIS — O28.3 ABNORMAL FETAL ULTRASOUND: ICD-10-CM

## 2023-12-21 DIAGNOSIS — O09.90 AT HIGH RISK FOR COMPLICATIONS OF INTRAUTERINE PREGNANCY (IUP): Primary | ICD-10-CM

## 2023-12-21 DIAGNOSIS — O99.213 OBESITY AFFECTING PREGNANCY IN THIRD TRIMESTER, UNSPECIFIED OBESITY TYPE: ICD-10-CM

## 2023-12-21 DIAGNOSIS — O10.013 PRE-EXISTING ESSENTIAL HYPERTENSION AFFECTING PREGNANCY IN THIRD TRIMESTER: ICD-10-CM

## 2023-12-21 DIAGNOSIS — D25.9 LEIOMYOMA OF UTERUS AFFECTING PREGNANCY IN THIRD TRIMESTER: ICD-10-CM

## 2023-12-27 ENCOUNTER — OFFICE VISIT (OUTPATIENT)
Dept: MATERNAL FETAL MEDICINE | Facility: CLINIC | Age: 28
End: 2023-12-27
Payer: MEDICAID

## 2023-12-27 ENCOUNTER — PROCEDURE VISIT (OUTPATIENT)
Dept: MATERNAL FETAL MEDICINE | Facility: CLINIC | Age: 28
End: 2023-12-27
Payer: MEDICAID

## 2023-12-27 VITALS
DIASTOLIC BLOOD PRESSURE: 85 MMHG | BODY MASS INDEX: 48.32 KG/M2 | HEIGHT: 65 IN | WEIGHT: 290 LBS | HEART RATE: 93 BPM | SYSTOLIC BLOOD PRESSURE: 143 MMHG

## 2023-12-27 DIAGNOSIS — O34.13 LEIOMYOMA OF UTERUS AFFECTING PREGNANCY IN THIRD TRIMESTER: ICD-10-CM

## 2023-12-27 DIAGNOSIS — D25.9 LEIOMYOMA OF UTERUS AFFECTING PREGNANCY IN THIRD TRIMESTER: ICD-10-CM

## 2023-12-27 DIAGNOSIS — O09.90 AT HIGH RISK FOR COMPLICATIONS OF INTRAUTERINE PREGNANCY (IUP): Primary | ICD-10-CM

## 2023-12-27 DIAGNOSIS — O28.3 ABNORMAL FETAL ULTRASOUND: ICD-10-CM

## 2023-12-27 DIAGNOSIS — O10.013 PRE-EXISTING ESSENTIAL HYPERTENSION AFFECTING PREGNANCY IN THIRD TRIMESTER: ICD-10-CM

## 2023-12-27 DIAGNOSIS — E66.01 MORBID OBESITY: ICD-10-CM

## 2023-12-27 DIAGNOSIS — O99.213 OBESITY AFFECTING PREGNANCY IN THIRD TRIMESTER, UNSPECIFIED OBESITY TYPE: ICD-10-CM

## 2023-12-27 DIAGNOSIS — O36.5931 LIGHT FOR GESTATIONAL DATES AFFECTING MANAGEMENT OF MOTHER, THIRD TRIMESTER, FETUS 1: ICD-10-CM

## 2023-12-27 DIAGNOSIS — O09.90 AT HIGH RISK FOR COMPLICATIONS OF INTRAUTERINE PREGNANCY (IUP): ICD-10-CM

## 2023-12-27 PROCEDURE — 3079F DIAST BP 80-89 MM HG: CPT | Mod: CPTII,S$GLB,, | Performed by: OBSTETRICS & GYNECOLOGY

## 2023-12-27 PROCEDURE — 1159F PR MEDICATION LIST DOCUMENTED IN MEDICAL RECORD: ICD-10-PCS | Mod: CPTII,S$GLB,, | Performed by: OBSTETRICS & GYNECOLOGY

## 2023-12-27 PROCEDURE — 99214 PR OFFICE/OUTPT VISIT, EST, LEVL IV, 30-39 MIN: ICD-10-PCS | Mod: TH,S$GLB,, | Performed by: OBSTETRICS & GYNECOLOGY

## 2023-12-27 PROCEDURE — 76819 FETAL BIOPHYS PROFIL W/O NST: CPT | Mod: S$GLB,,, | Performed by: OBSTETRICS & GYNECOLOGY

## 2023-12-27 PROCEDURE — 3079F PR MOST RECENT DIASTOLIC BLOOD PRESSURE 80-89 MM HG: ICD-10-PCS | Mod: CPTII,S$GLB,, | Performed by: OBSTETRICS & GYNECOLOGY

## 2023-12-27 PROCEDURE — 76819 PR US, OB, FETAL BIOPHYSICAL, W/O NST: ICD-10-PCS | Mod: S$GLB,,, | Performed by: OBSTETRICS & GYNECOLOGY

## 2023-12-27 PROCEDURE — 99214 OFFICE O/P EST MOD 30 MIN: CPT | Mod: TH,S$GLB,, | Performed by: OBSTETRICS & GYNECOLOGY

## 2023-12-27 PROCEDURE — 3008F BODY MASS INDEX DOCD: CPT | Mod: CPTII,S$GLB,, | Performed by: OBSTETRICS & GYNECOLOGY

## 2023-12-27 PROCEDURE — 3077F PR MOST RECENT SYSTOLIC BLOOD PRESSURE >= 140 MM HG: ICD-10-PCS | Mod: CPTII,S$GLB,, | Performed by: OBSTETRICS & GYNECOLOGY

## 2023-12-27 PROCEDURE — 3077F SYST BP >= 140 MM HG: CPT | Mod: CPTII,S$GLB,, | Performed by: OBSTETRICS & GYNECOLOGY

## 2023-12-27 PROCEDURE — 3008F PR BODY MASS INDEX (BMI) DOCUMENTED: ICD-10-PCS | Mod: CPTII,S$GLB,, | Performed by: OBSTETRICS & GYNECOLOGY

## 2023-12-27 PROCEDURE — 1159F MED LIST DOCD IN RCRD: CPT | Mod: CPTII,S$GLB,, | Performed by: OBSTETRICS & GYNECOLOGY

## 2023-12-27 NOTE — PROGRESS NOTES
Maternal Fetal Medicine Follow Up    Subjective     Patient ID: [id number]    Chief Complaint: MFM follow up with US (Patient is having headaches.)      HPI: Pari Peterson is a 28 y.o. female  at 31w4d gestation with Estimated Date of Delivery: 24  who is here for follow  up consultation by MFM.    She has chronic hypertension, diagnosed earlier in the pregnancy, and is currently on labetalol 200 mg q8 hours.  She is on low-dose aspirin daily.  On 2023, she had 24 hour urine with 132 mg protein.  She has increased BMI of 48 at consult visit.  She had absent fetal nasal bone seen on consult ultrasound and had negative cell free DNA.  She has fibroid uterus.  She had low-lying placenta seen on outside ultrasound, not seen on follow-up and vasa previa was ruled out.       Interval history since last MFM visit: None.. She denies any leaking fluid, vaginal bleeding, contractions, decreased fetal movement. Denies headaches, visual disturbances, or epigastric pain.    Pregnancy complications include:   Patient Active Problem List   Diagnosis    Pre-existing essential hypertension affecting pregnancy in third trimester    Abnormal fetal ultrasound (absent nasal bone)    Leiomyoma of uterus affecting pregnancy in third trimester    Increased BMI affecting pregnancy in third trimester    At high risk for complications of intrauterine pregnancy (IUP)    Light for gestational dates (EFW 17% on 2023) affecting management of mother, third trimester, fetus 1    BMI over 45        No changes to medical, surgical, family, social, or obstetric history.    Medications:  Current Outpatient Medications   Medication Instructions    albuterol (PROVENTIL) 2.5 mg /3 mL (0.083 %) nebulizer solution SMARTSI Vial(s) Via Nebulizer Every 8 Hours PRN    albuterol (PROVENTIL/VENTOLIN HFA) 90 mcg/actuation inhaler 1 puff, Every 4 hours    aspirin 81 mg, Oral, Daily    FEROSUL 325 mg (65 mg iron) Tab tablet Oral, 2 times  "daily    labetaloL (NORMODYNE) 200 mg, Oral, 3 times daily    labetalol HCl (INV LABETALOL) 200 MG tablet No dose, route, or frequency recorded.    ondansetron (ZOFRAN) 4 mg, Oral, Every 6 hours PRN    prenatal vit/iron fum/folic ac (PRENATAL 1+1 ORAL) Oral       Review of Systems   12 point review of systems conducted, negative except as stated in the history of present illness. See HPI for details.      Objective     Visit Vitals  BP (!) 143/85 (BP Location: Right arm, Patient Position: Sitting, BP Method: X-Large (Automatic))   Pulse 93   Ht 5' 5" (1.651 m)   Wt 131.5 kg (290 lb)   BMI 48.26 kg/m²          Physical Exam  Vitals and nursing note reviewed.   Constitutional:       Appearance: Normal appearance.      Comments: Increased BMI   HENT:      Head: Normocephalic and atraumatic.      Nose: Nose normal. No congestion.   Cardiovascular:      Rate and Rhythm: Normal rate.   Pulmonary:      Effort: Pulmonary effort is normal.   Skin:     Findings: No rash.   Neurological:      Mental Status: She is alert and oriented to person, place, and time.   Psychiatric:         Mood and Affect: Mood normal.         Behavior: Behavior normal.         Thought Content: Thought content normal.         Judgment: Judgment normal.           ASSESSMENT/PLAN:     28 y.o.  female with IUP at 31w4d    Chronic hypertension in pregnancy with low-normal fetal growth  There is low normal fetal growth with an EFW of 1350 g at the 17% and the AC at the 19% on 2023.  AFV is normal.  BPP 8/8.  With estimated fetal weight at 17th percentile with possible more significant fetal growth restriction in view of range of error of ultrasound, umbilical artery Doppler was done and was normal.    Chronic hypertension complicates up to 2-5% of pregnancies and is associated with significant adverse pregnancy outcomes including  birth, fetal growth restriction, fetal demise, placental abruption, and  delivery.    BP Readings " from Last 1 Encounters:   23 (!) 143/85     With this BP, with a another reading that was 155/103 she was advised to continue low salt diet, and adjust labetalol to 300 mg q.8 hours .     Patient will go tomorrow to Dr. Gallardo's office and have her blood pressure rechecked.    Low dose aspirin as discussed.    With low-normal fetal growth, we will plan to recheck growth 3 weeks from the last check and continue twice weekly fetal testing, alternating with primary Ob until delivery.  Dr. Gallardo will be seeing the patient early in the week for an NST and will see her later in the week on .        Increased BMI in pregnancy  Body mass index is 48.26 kg/m². With relatively stable weight recently, she was advised to continue healthy low caloric and low salt diet.  Excess weight gain would be associated with gestational hypertension, gestational diabetes and adverse  outcomes, including fetal demise in utero.    With risk factors associated with increased BMI, she is to do fetal kick counts throughout the pregnancy (after 24 weeks).    It is important to lose weight after the pregnancy is over, especially before a future pregnancy was discussed. Breastfeeding may be an important tool in reducing the postpartum weight retention. Fetal risks were discussed with short term risk of fetal/ obesity and long term risk of adolescent component of metabolic syndrome.       Leiomyoma in pregnancy  Right intramural fibroid measuring 6.9 cm in diameter on 2023    Risks of fibroids include  delivery,  PROM, abnormal presentations, higher risk of  sections, placenta previa, fibroid degeneration, poor fetal growth, oligohydramnios, vaginal bleeding and postpartum hemorrhage and in rare circumstances obstruction of lower uterus/cervix, impeding vaginal delivery    If concern for degenerating fibroids, can administer short course of NSAID (48-72hrs of Indocin) prior to 32 weeks  gestation.     She was instructed to report any increased pain, cramps, vaginal discharge or bleeding. Emphasized the importance of monitoring fetal kick count activity, and reporting immediately if decreased fetal movement occurs.    Document postpartum hemorrhage risk on admission to hospital, ensure T&S sent, and consider type and crossmatch depending on postpartum hemorrhage risk      Absent nasal bone  She already had negative cell free DNA. She declined amniocentesis . She is aware of the small risk of aneuploidy and need for  evaluation.  Additionally, the limitation of ultrasound in checking for anomalies and the need for  evaluation was emphasized.    Patient was advised that in the absence of aneuploidy absent nasal bone could be a familial issue with depressed nasal bridge.      At high risk for preeclampsia  With her increased risk for preeclampsia, she agreed to continue asa 81 mg BID until 34 6/7 weeks, then decrease to once daily until delivery.. Preeclampsia precautions reviewed.    Discussed with Dr. Gallardo.  Patient will see Dr. Gallardo tomorrow, we will go for an NST at the hospital on  for a blood pressure check and we will see her back in 1 week here.  Fetal kick counts and preeclampsia warnings given.    Follow up in about 1 week (around 1/3/2024) for BPP, MFM follow-up.     No future appointments.      MARY involvement: Patient was evaluated by ARMINDA Lakhani and Dr. Blackman.  Final assessment and recommendations as stated above were made by Dr. Blackman.    Components of this note were documented using voice recognition systems and are subject to errors not corrected at proofreading. Please contact the author for any clarifications.

## 2024-01-02 DIAGNOSIS — O10.013 PRE-EXISTING ESSENTIAL HYPERTENSION AFFECTING PREGNANCY IN THIRD TRIMESTER: ICD-10-CM

## 2024-01-02 DIAGNOSIS — O99.213 OBESITY AFFECTING PREGNANCY IN THIRD TRIMESTER, UNSPECIFIED OBESITY TYPE: ICD-10-CM

## 2024-01-02 DIAGNOSIS — O28.3 ABNORMAL FETAL ULTRASOUND: ICD-10-CM

## 2024-01-02 DIAGNOSIS — D25.9 LEIOMYOMA OF UTERUS AFFECTING PREGNANCY IN THIRD TRIMESTER: ICD-10-CM

## 2024-01-02 DIAGNOSIS — O34.13 LEIOMYOMA OF UTERUS AFFECTING PREGNANCY IN THIRD TRIMESTER: ICD-10-CM

## 2024-01-02 DIAGNOSIS — O09.90 AT HIGH RISK FOR COMPLICATIONS OF INTRAUTERINE PREGNANCY (IUP): Primary | ICD-10-CM

## 2024-01-03 ENCOUNTER — PROCEDURE VISIT (OUTPATIENT)
Dept: MATERNAL FETAL MEDICINE | Facility: CLINIC | Age: 29
End: 2024-01-03
Payer: MEDICAID

## 2024-01-03 ENCOUNTER — OFFICE VISIT (OUTPATIENT)
Dept: MATERNAL FETAL MEDICINE | Facility: CLINIC | Age: 29
End: 2024-01-03
Payer: MEDICAID

## 2024-01-03 VITALS
DIASTOLIC BLOOD PRESSURE: 75 MMHG | SYSTOLIC BLOOD PRESSURE: 115 MMHG | HEART RATE: 91 BPM | WEIGHT: 293 LBS | BODY MASS INDEX: 48.82 KG/M2 | HEIGHT: 65 IN

## 2024-01-03 DIAGNOSIS — O10.013 PRE-EXISTING ESSENTIAL HYPERTENSION AFFECTING PREGNANCY IN THIRD TRIMESTER: ICD-10-CM

## 2024-01-03 DIAGNOSIS — O09.90 AT HIGH RISK FOR COMPLICATIONS OF INTRAUTERINE PREGNANCY (IUP): ICD-10-CM

## 2024-01-03 DIAGNOSIS — E66.01 MORBID OBESITY: ICD-10-CM

## 2024-01-03 DIAGNOSIS — O10.919 CHRONIC HYPERTENSION AFFECTING PREGNANCY: Primary | ICD-10-CM

## 2024-01-03 DIAGNOSIS — O28.3 ABNORMAL FETAL ULTRASOUND: ICD-10-CM

## 2024-01-03 DIAGNOSIS — O09.90 AT HIGH RISK FOR COMPLICATIONS OF INTRAUTERINE PREGNANCY (IUP): Primary | ICD-10-CM

## 2024-01-03 DIAGNOSIS — D25.9 LEIOMYOMA OF UTERUS AFFECTING PREGNANCY IN THIRD TRIMESTER: ICD-10-CM

## 2024-01-03 DIAGNOSIS — O34.13 LEIOMYOMA OF UTERUS AFFECTING PREGNANCY IN THIRD TRIMESTER: ICD-10-CM

## 2024-01-03 DIAGNOSIS — O36.5931 LIGHT FOR GESTATIONAL DATES AFFECTING MANAGEMENT OF MOTHER, THIRD TRIMESTER, FETUS 1: ICD-10-CM

## 2024-01-03 DIAGNOSIS — O99.213 OBESITY AFFECTING PREGNANCY IN THIRD TRIMESTER, UNSPECIFIED OBESITY TYPE: ICD-10-CM

## 2024-01-03 PROCEDURE — 3008F BODY MASS INDEX DOCD: CPT | Mod: CPTII,S$GLB,, | Performed by: OBSTETRICS & GYNECOLOGY

## 2024-01-03 PROCEDURE — 3078F DIAST BP <80 MM HG: CPT | Mod: CPTII,S$GLB,, | Performed by: OBSTETRICS & GYNECOLOGY

## 2024-01-03 PROCEDURE — 3074F SYST BP LT 130 MM HG: CPT | Mod: CPTII,S$GLB,, | Performed by: OBSTETRICS & GYNECOLOGY

## 2024-01-03 PROCEDURE — 1159F MED LIST DOCD IN RCRD: CPT | Mod: CPTII,S$GLB,, | Performed by: OBSTETRICS & GYNECOLOGY

## 2024-01-03 PROCEDURE — 76819 FETAL BIOPHYS PROFIL W/O NST: CPT | Mod: S$GLB,,, | Performed by: OBSTETRICS & GYNECOLOGY

## 2024-01-03 PROCEDURE — 99213 OFFICE O/P EST LOW 20 MIN: CPT | Mod: TH,S$GLB,, | Performed by: OBSTETRICS & GYNECOLOGY

## 2024-01-03 NOTE — PROGRESS NOTES
Maternal Fetal Medicine Follow Up    Subjective     Patient ID: [id number]    Chief Complaint: MFM follow up with US      HPI: Pari Peterson is a 28 y.o. female  at 32w4d gestation with Estimated Date of Delivery: 24  who is here for follow  up consultation by MFM.    She has chronic hypertension, diagnosed earlier in the pregnancy, and is currently on labetalol 300 mg q8 hours.  She is on low-dose aspirin daily.  On 2023, she had 24 hour urine with 132 mg protein.  She has increased BMI of 48 at consult visit.  She had absent fetal nasal bone seen on consult ultrasound and had negative cell free DNA.  She has fibroid uterus.  She had low-lying placenta seen on outside ultrasound, not seen on follow-up and vasa previa was ruled out.  She had low normal fetal growth on previous ultrasound.       Interval history since last MFM visit: None.. She denies any leaking fluid, vaginal bleeding, contractions, decreased fetal movement. Denies headaches, visual disturbances, or epigastric pain.    Pregnancy complications include:   Patient Active Problem List   Diagnosis    Pre-existing essential hypertension affecting pregnancy in third trimester    Abnormal fetal ultrasound (absent nasal bone)    Leiomyoma of uterus affecting pregnancy in third trimester    Increased BMI affecting pregnancy in third trimester    At high risk for complications of intrauterine pregnancy (IUP)    Light for gestational dates (EFW 17% on 2023) affecting management of mother, third trimester, fetus 1    BMI over 45        No changes to medical, surgical, family, social, or obstetric history.    Medications:  Current Outpatient Medications   Medication Instructions    albuterol (PROVENTIL) 2.5 mg /3 mL (0.083 %) nebulizer solution SMARTSI Vial(s) Via Nebulizer Every 8 Hours PRN    albuterol (PROVENTIL/VENTOLIN HFA) 90 mcg/actuation inhaler 1 puff, Every 4 hours    aspirin 81 mg, Oral, Daily    FEROSUL 325 mg (65 mg iron) Tab  "tablet Oral, 2 times daily    labetaloL (NORMODYNE) 200 mg, Oral, 3 times daily    labetalol HCl (INV LABETALOL) 200 MG tablet No dose, route, or frequency recorded.    ondansetron (ZOFRAN) 4 mg, Oral, Every 6 hours PRN    prenatal vit/iron fum/folic ac (PRENATAL 1+1 ORAL) Oral       Review of Systems   12 point review of systems conducted, negative except as stated in the history of present illness. See HPI for details.      Objective     Visit Vitals  /75 (BP Location: Right arm, Patient Position: Sitting, BP Method: X-Large (Automatic))   Pulse 91   Ht 5' 5" (1.651 m)   Wt 133.8 kg (295 lb)   BMI 49.09 kg/m²         Physical Exam  Vitals and nursing note reviewed.   Constitutional:       Appearance: Normal appearance.      Comments: Increased BMI   HENT:      Head: Normocephalic and atraumatic.      Nose: Nose normal. No congestion.   Cardiovascular:      Rate and Rhythm: Normal rate.   Pulmonary:      Effort: Pulmonary effort is normal.   Skin:     Findings: No rash.   Neurological:      Mental Status: She is alert and oriented to person, place, and time.   Psychiatric:         Mood and Affect: Mood normal.         Behavior: Behavior normal.         Thought Content: Thought content normal.         Judgment: Judgment normal.           ASSESSMENT/PLAN:     28 y.o.  female with IUP at 32w4d    Chronic hypertension in pregnancy with low-normal fetal growth  There is low normal fetal growth with an EFW of 1350 g at the 17% and the AC at the 19% on 2023.  AFV is normal.  BPP 8/8.  With estimated fetal weight at 17th percentile with possible more significant fetal growth restriction in view of range of error of ultrasound, umbilical artery Doppler was done and was normal.    Chronic hypertension complicates up to 2-5% of pregnancies and is associated with significant adverse pregnancy outcomes including  birth, fetal growth restriction, fetal demise, placental abruption, and  " delivery.    BP Readings from Last 1 Encounters:   24 115/75     With this BP, with a another reading that [id number] she was advised to continue low salt diet, and start labetalol at 300 mg q8 hours .     Low dose aspirin as discussed.    With low-normal fetal growth, we will plan to recheck fetal growth in 1 week and continue twice weekly fetal testing, alternating with primary OB until delivery.  Dr. Gallardo will be seeing the patient early in the week for an NST and will see her later in the week on .        Increased BMI in pregnancy  Body mass index is 49.09 kg/m². With relatively stable weight recently, she was advised to continue healthy low caloric and low salt diet.  Excess weight gain would be associated with gestational hypertension, gestational diabetes and adverse  outcomes, including fetal demise in utero.    With risk factors associated with increased BMI, she is to do fetal kick counts throughout the pregnancy (after 24 weeks).    It is important to lose weight after the pregnancy is over, especially before a future pregnancy was discussed. Breastfeeding may be an important tool in reducing the postpartum weight retention. Fetal risks were discussed with short term risk of fetal/ obesity and long term risk of adolescent component of metabolic syndrome.       Leiomyoma in pregnancy  Right intramural fibroid measuring 6.9 cm in diameter on 2023    Risks of fibroids include  delivery,  PROM, abnormal presentations, higher risk of  sections, placenta previa, fibroid degeneration, poor fetal growth, oligohydramnios, vaginal bleeding and postpartum hemorrhage and in rare circumstances obstruction of lower uterus/cervix, impeding vaginal delivery    If concern for degenerating fibroids, can administer short course of NSAID (48-72hrs of Indocin) prior to 32 weeks gestation.     She was instructed to report any increased pain, cramps, vaginal discharge  or bleeding. Emphasized the importance of monitoring fetal kick count activity, and reporting immediately if decreased fetal movement occurs.    Document postpartum hemorrhage risk on admission to hospital, ensure T&S sent, and consider type and crossmatch depending on postpartum hemorrhage risk      Absent nasal bone  She already had negative cell free DNA. She declined amniocentesis . She is aware of the small risk of aneuploidy and need for  evaluation.  Additionally, the limitation of ultrasound in checking for anomalies and the need for  evaluation was emphasized.    Patient was advised that in the absence of aneuploidy absent nasal bone could be a familial issue with depressed nasal bridge.      At high risk for preeclampsia  With her increased risk for preeclampsia, she agreed to continue asa 81 mg BID until 34 6/7 weeks, then decrease to once daily until delivery.. Preeclampsia precautions reviewed.    Blood pressure is stable.  No adjustment was done on the labetalol 300 mg q.8 hours.  Continue aspirin b.i.d..  Fetal kick count instructions.  Preeclampsia warnings.    Follow up in about 5 days (around 2024) for MFM follow-up, Repeat ultrasound, BPP, Thursday.     No future appointments.    MARY involvement: Patient was evaluated by ARMINDA Lakhani and Dr. Blackman.  Final assessment and recommendations as stated above were made by Dr. Blackman.    Components of this note were documented using voice recognition systems and are subject to errors not corrected at proofreading. Please contact the author for any clarifications.

## 2024-01-08 ENCOUNTER — PROCEDURE VISIT (OUTPATIENT)
Dept: MATERNAL FETAL MEDICINE | Facility: CLINIC | Age: 29
End: 2024-01-08
Payer: MEDICAID

## 2024-01-08 ENCOUNTER — OFFICE VISIT (OUTPATIENT)
Dept: MATERNAL FETAL MEDICINE | Facility: CLINIC | Age: 29
End: 2024-01-08
Payer: MEDICAID

## 2024-01-08 VITALS
HEIGHT: 65 IN | HEART RATE: 98 BPM | WEIGHT: 290 LBS | BODY MASS INDEX: 48.32 KG/M2 | DIASTOLIC BLOOD PRESSURE: 81 MMHG | SYSTOLIC BLOOD PRESSURE: 128 MMHG

## 2024-01-08 DIAGNOSIS — O10.919 CHRONIC HYPERTENSION AFFECTING PREGNANCY: Primary | ICD-10-CM

## 2024-01-08 DIAGNOSIS — E66.01 MORBID OBESITY: ICD-10-CM

## 2024-01-08 DIAGNOSIS — O36.5931 LIGHT FOR GESTATIONAL DATES AFFECTING MANAGEMENT OF MOTHER, THIRD TRIMESTER, FETUS 1: ICD-10-CM

## 2024-01-08 DIAGNOSIS — O10.919 CHRONIC HYPERTENSION AFFECTING PREGNANCY: ICD-10-CM

## 2024-01-08 DIAGNOSIS — O09.90 AT HIGH RISK FOR COMPLICATIONS OF INTRAUTERINE PREGNANCY (IUP): Primary | ICD-10-CM

## 2024-01-08 DIAGNOSIS — D25.9 LEIOMYOMA OF UTERUS AFFECTING PREGNANCY IN THIRD TRIMESTER: ICD-10-CM

## 2024-01-08 DIAGNOSIS — O10.013 PRE-EXISTING ESSENTIAL HYPERTENSION AFFECTING PREGNANCY IN THIRD TRIMESTER: ICD-10-CM

## 2024-01-08 DIAGNOSIS — O28.3 ABNORMAL FETAL ULTRASOUND: ICD-10-CM

## 2024-01-08 DIAGNOSIS — O99.213 OBESITY AFFECTING PREGNANCY IN THIRD TRIMESTER, UNSPECIFIED OBESITY TYPE: ICD-10-CM

## 2024-01-08 DIAGNOSIS — O34.13 LEIOMYOMA OF UTERUS AFFECTING PREGNANCY IN THIRD TRIMESTER: ICD-10-CM

## 2024-01-08 PROCEDURE — 76816 OB US FOLLOW-UP PER FETUS: CPT | Mod: S$GLB,,, | Performed by: OBSTETRICS & GYNECOLOGY

## 2024-01-08 PROCEDURE — 3074F SYST BP LT 130 MM HG: CPT | Mod: CPTII,S$GLB,, | Performed by: OBSTETRICS & GYNECOLOGY

## 2024-01-08 PROCEDURE — 76819 FETAL BIOPHYS PROFIL W/O NST: CPT | Mod: S$GLB,,, | Performed by: OBSTETRICS & GYNECOLOGY

## 2024-01-08 PROCEDURE — 3008F BODY MASS INDEX DOCD: CPT | Mod: CPTII,S$GLB,, | Performed by: OBSTETRICS & GYNECOLOGY

## 2024-01-08 PROCEDURE — 3079F DIAST BP 80-89 MM HG: CPT | Mod: CPTII,S$GLB,, | Performed by: OBSTETRICS & GYNECOLOGY

## 2024-01-08 PROCEDURE — 99213 OFFICE O/P EST LOW 20 MIN: CPT | Mod: TH,S$GLB,, | Performed by: OBSTETRICS & GYNECOLOGY

## 2024-01-08 PROCEDURE — 1159F MED LIST DOCD IN RCRD: CPT | Mod: CPTII,S$GLB,, | Performed by: OBSTETRICS & GYNECOLOGY

## 2024-01-08 RX ORDER — LABETALOL 300 MG/1
300 TABLET, FILM COATED ORAL EVERY 8 HOURS
Qty: 90 TABLET | Refills: 3 | Status: SHIPPED | OUTPATIENT
Start: 2024-01-08 | End: 2024-01-29 | Stop reason: SDUPTHER

## 2024-01-08 RX ORDER — AMOXICILLIN 500 MG/1
500 CAPSULE ORAL 2 TIMES DAILY
COMMUNITY
Start: 2024-01-05 | End: 2024-01-29

## 2024-01-08 NOTE — PROGRESS NOTES
Maternal Fetal Medicine Follow Up    Subjective     Patient ID: [id number]    Chief Complaint: MFM follow up with US      HPI: Pari Peterson is a 28 y.o. female  at 33w2d gestation with Estimated Date of Delivery: 24  who is here for follow  up consultation by MFM.    She has chronic hypertension, diagnosed earlier in the pregnancy, and is currently on labetalol 300 mg q8 hours.  She is on low-dose aspirin daily.  On 2023, she had 24 hour urine with 132 mg protein.  She has increased BMI of 48 at consult visit.  She had absent fetal nasal bone seen on consult ultrasound and had negative cell free DNA.  She has fibroid uterus.  She had low-lying placenta seen on outside ultrasound, not seen on follow-up and vasa previa was ruled out.  She had low normal fetal growth on previous ultrasound.       Interval history since last MFM visit: None.. She denies any leaking fluid, vaginal bleeding, contractions, decreased fetal movement. Denies headaches, visual disturbances, or epigastric pain.    Pregnancy complications include:   Patient Active Problem List   Diagnosis    Pre-existing essential hypertension affecting pregnancy in third trimester    Abnormal fetal ultrasound (absent nasal bone)    Leiomyoma of uterus affecting pregnancy in third trimester    Increased BMI affecting pregnancy in third trimester    At high risk for complications of intrauterine pregnancy (IUP)    Light for gestational dates (EFW 17% on 2023) affecting management of mother, third trimester, fetus 1    BMI over 45        No changes to medical, surgical, family, social, or obstetric history.    Medications:  Current Outpatient Medications   Medication Instructions    albuterol (PROVENTIL) 2.5 mg /3 mL (0.083 %) nebulizer solution SMARTSI Vial(s) Via Nebulizer Every 8 Hours PRN    albuterol (PROVENTIL/VENTOLIN HFA) 90 mcg/actuation inhaler 1 puff, Every 4 hours    amoxicillin (AMOXIL) 500 mg, Oral, 2 times daily    aspirin  "81 mg, Oral, Daily    FEROSUL 325 mg (65 mg iron) Tab tablet Oral, 2 times daily    labetaloL (NORMODYNE) 300 mg, Oral, Every 8 hours    labetalol HCl (INV LABETALOL) 200 MG tablet No dose, route, or frequency recorded.    ondansetron (ZOFRAN) 4 mg, Oral, Every 6 hours PRN    prenatal vit/iron fum/folic ac (PRENATAL 1+1 ORAL) Oral       Review of Systems   12 point review of systems conducted, negative except as stated in the history of present illness. See HPI for details.      Objective     Visit Vitals  /81 (BP Location: Left arm, Patient Position: Sitting, BP Method: Large (Automatic))   Pulse 98   Ht 5' 5" (1.651 m)   Wt 131.5 kg (290 lb)   BMI 48.26 kg/m²         Physical Exam  Vitals and nursing note reviewed.   Constitutional:       Appearance: Normal appearance.      Comments: Increased BMI   HENT:      Head: Normocephalic and atraumatic.      Nose: Nose normal. No congestion.   Cardiovascular:      Rate and Rhythm: Normal rate.   Pulmonary:      Effort: Pulmonary effort is normal.   Skin:     Findings: No rash.   Neurological:      Mental Status: She is alert and oriented to person, place, and time.   Psychiatric:         Mood and Affect: Mood normal.         Behavior: Behavior normal.         Thought Content: Thought content normal.         Judgment: Judgment normal.           ASSESSMENT/PLAN:     28 y.o.  female with IUP at 33w2d    Chronic hypertension in pregnancy with low-normal fetal growth  There is low normal fetal growth with continued fetal growth with an EFW of 1896 g at the 14% and the AC at the 18% on 2024.  AFV is normal.  BPP 8/8.    Chronic hypertension complicates up to 2-5% of pregnancies and is associated with significant adverse pregnancy outcomes including  birth, fetal growth restriction, fetal demise, placental abruption, and  delivery.    BP Readings from Last 1 Encounters:   24 128/81     With this BP, with a another reading that [id number] she was " advised to continue low salt diet, and continue labetalol at 300 mg q8 hours .     Low dose aspirin as discussed.    With low-normal fetal growth, we will plan to recheck fetal growth in 1 week and continue twice weekly fetal testing, alternating with primary OB until delivery.  Dr. Gallardo will be seeing the patient early in the week for an NST and will see her later in the week on .        Increased BMI in pregnancy  Body mass index is 48.26 kg/m². With relatively stable weight recently, she was advised to continue healthy low caloric and low salt diet.  Excess weight gain would be associated with gestational hypertension, gestational diabetes and adverse  outcomes, including fetal demise in utero.    With risk factors associated with increased BMI, she is to do fetal kick counts throughout the pregnancy (after 24 weeks).    It is important to lose weight after the pregnancy is over, especially before a future pregnancy was discussed. Breastfeeding may be an important tool in reducing the postpartum weight retention. Fetal risks were discussed with short term risk of fetal/ obesity and long term risk of adolescent component of metabolic syndrome.       Leiomyoma in pregnancy  Right intramural fibroid measuring 5 cm in diameter on 2024, slightly smaller from previous assessment.    Risks of fibroids include  delivery,  PROM, abnormal presentations, higher risk of  sections, placenta previa, fibroid degeneration, poor fetal growth, oligohydramnios, vaginal bleeding and postpartum hemorrhage and in rare circumstances obstruction of lower uterus/cervix, impeding vaginal delivery    If concern for degenerating fibroids, can administer short course of NSAID (48-72hrs of Indocin) prior to 32 weeks gestation.     She was instructed to report any increased pain, cramps, vaginal discharge or bleeding. Emphasized the importance of monitoring fetal kick count activity, and  reporting immediately if decreased fetal movement occurs.    Document postpartum hemorrhage risk on admission to hospital, ensure T&S sent, and consider type and crossmatch depending on postpartum hemorrhage risk      Absent nasal bone  She already had negative cell free DNA. She declined amniocentesis . She is aware of the small risk of aneuploidy and need for  evaluation.  Additionally, the limitation of ultrasound in checking for anomalies and the need for  evaluation was emphasized.    Patient was advised that in the absence of aneuploidy absent nasal bone could be a familial issue with depressed nasal bridge.      At high risk for preeclampsia  With her increased risk for preeclampsia, she agreed to continue asa 81 mg BID until 34 6/7 weeks, then decrease to once daily until delivery.. Preeclampsia precautions reviewed.    Blood pressure is stable and no adjustment was done on the dose of medication.  There is normal fetal growth.  Fibroid is stable.  She will continue same dose of labetalol 300 q.8h and aspirin b.i.d..  There is continued low-normal growth.  With BMI over 45 and chronic hypertension on medication we will plan to do twice weekly fetal testing alternating with primary OB.  Next week we will plan to see patient on Thursday and have Dr. Gallardo see her earlier in the week and will switch back to early in week Monday with the us and  with them after the next week      Follow up in about 10 days (around 2024) for MFM follow-up, BPP.     Future Appointments   Date Time Provider Department Center   2024  9:30 AM Samuel Blackman MD [facility]   2024  9:30 AM ROOM 3, [facility] John HESTER         MARY involvement: Patient was evaluated by ARMINDA Lakhani and Dr. Blackman.  Final assessment and recommendations as stated above were made by Dr. Blackman.    Components of this note were documented using voice recognition systems and are subject to errors not  corrected at proofreading. Please contact the author for any clarifications.

## 2024-01-17 DIAGNOSIS — E66.01 MORBID OBESITY: Primary | ICD-10-CM

## 2024-01-18 ENCOUNTER — OFFICE VISIT (OUTPATIENT)
Dept: MATERNAL FETAL MEDICINE | Facility: CLINIC | Age: 29
End: 2024-01-18
Payer: MEDICAID

## 2024-01-18 ENCOUNTER — PROCEDURE VISIT (OUTPATIENT)
Dept: MATERNAL FETAL MEDICINE | Facility: CLINIC | Age: 29
End: 2024-01-18
Payer: MEDICAID

## 2024-01-18 VITALS
DIASTOLIC BLOOD PRESSURE: 80 MMHG | SYSTOLIC BLOOD PRESSURE: 132 MMHG | WEIGHT: 293 LBS | BODY MASS INDEX: 48.82 KG/M2 | HEART RATE: 95 BPM | HEIGHT: 65 IN

## 2024-01-18 DIAGNOSIS — O09.90 AT HIGH RISK FOR COMPLICATIONS OF INTRAUTERINE PREGNANCY (IUP): Primary | ICD-10-CM

## 2024-01-18 DIAGNOSIS — O36.5931 LIGHT FOR GESTATIONAL DATES AFFECTING MANAGEMENT OF MOTHER, THIRD TRIMESTER, FETUS 1: ICD-10-CM

## 2024-01-18 DIAGNOSIS — O34.13 LEIOMYOMA OF UTERUS AFFECTING PREGNANCY IN THIRD TRIMESTER: ICD-10-CM

## 2024-01-18 DIAGNOSIS — O28.3 ABNORMAL FETAL ULTRASOUND: ICD-10-CM

## 2024-01-18 DIAGNOSIS — D25.9 LEIOMYOMA OF UTERUS AFFECTING PREGNANCY IN THIRD TRIMESTER: ICD-10-CM

## 2024-01-18 DIAGNOSIS — E66.01 MORBID OBESITY: ICD-10-CM

## 2024-01-18 DIAGNOSIS — O99.213 OBESITY AFFECTING PREGNANCY IN THIRD TRIMESTER, UNSPECIFIED OBESITY TYPE: ICD-10-CM

## 2024-01-18 DIAGNOSIS — O10.013 PRE-EXISTING ESSENTIAL HYPERTENSION AFFECTING PREGNANCY IN THIRD TRIMESTER: ICD-10-CM

## 2024-01-18 PROCEDURE — 99213 OFFICE O/P EST LOW 20 MIN: CPT | Mod: TH,S$GLB,, | Performed by: OBSTETRICS & GYNECOLOGY

## 2024-01-18 PROCEDURE — 1159F MED LIST DOCD IN RCRD: CPT | Mod: CPTII,S$GLB,, | Performed by: OBSTETRICS & GYNECOLOGY

## 2024-01-18 PROCEDURE — 3079F DIAST BP 80-89 MM HG: CPT | Mod: CPTII,S$GLB,, | Performed by: OBSTETRICS & GYNECOLOGY

## 2024-01-18 PROCEDURE — 3008F BODY MASS INDEX DOCD: CPT | Mod: CPTII,S$GLB,, | Performed by: OBSTETRICS & GYNECOLOGY

## 2024-01-18 PROCEDURE — 76819 FETAL BIOPHYS PROFIL W/O NST: CPT | Mod: S$GLB,,, | Performed by: OBSTETRICS & GYNECOLOGY

## 2024-01-18 PROCEDURE — 3075F SYST BP GE 130 - 139MM HG: CPT | Mod: CPTII,S$GLB,, | Performed by: OBSTETRICS & GYNECOLOGY

## 2024-01-18 NOTE — PROGRESS NOTES
Maternal Fetal Medicine Follow Up    Subjective     Patient ID: [id number]    Chief Complaint: M follow up w/us       HPI: Pari Peterson is a 28 y.o. female  at 34w5d gestation with Estimated Date of Delivery: 24  who is here for follow  up consultation by MFM.    She has chronic hypertension, diagnosed earlier in the pregnancy, and is currently on labetalol 300 mg q8 hours.  She is on low-dose aspirin daily.  On 2023, she had 24 hour urine with 132 mg protein.  She has increased BMI of 48 at consult visit.  She had absent fetal nasal bone seen on consult ultrasound and had negative cell free DNA.  She has fibroid uterus.  She had low-lying placenta seen on outside ultrasound, not seen on follow-up and vasa previa was ruled out.  She had low normal fetal growth on previous ultrasound.       Interval history since last MFM visit: None.. She denies any leaking fluid, vaginal bleeding, contractions, decreased fetal movement. Denies headaches, visual disturbances, or epigastric pain.    Pregnancy complications include:   Patient Active Problem List   Diagnosis    Pre-existing essential hypertension affecting pregnancy in third trimester    Abnormal fetal ultrasound (absent nasal bone)    Leiomyoma of uterus affecting pregnancy in third trimester    Increased BMI affecting pregnancy in third trimester    At high risk for complications of intrauterine pregnancy (IUP)    Light for gestational dates (EFW 17% on 2023) affecting management of mother, third trimester, fetus 1    BMI over 45        No changes to medical, surgical, family, social, or obstetric history.    Medications:  Current Outpatient Medications   Medication Instructions    albuterol (PROVENTIL) 2.5 mg /3 mL (0.083 %) nebulizer solution SMARTSI Vial(s) Via Nebulizer Every 8 Hours PRN    albuterol (PROVENTIL/VENTOLIN HFA) 90 mcg/actuation inhaler 1 puff, Every 4 hours    amoxicillin (AMOXIL) 500 mg, Oral, 2 times daily    aspirin 81  "mg, Oral, Daily    FEROSUL 325 mg (65 mg iron) Tab tablet Oral, 2 times daily    labetaloL (NORMODYNE) 300 mg, Oral, Every 8 hours    labetalol HCl (INV LABETALOL) 200 MG tablet No dose, route, or frequency recorded.    ondansetron (ZOFRAN) 4 mg, Oral, Every 6 hours PRN    prenatal vit/iron fum/folic ac (PRENATAL 1+1 ORAL) Oral       Review of Systems   12 point review of systems conducted, negative except as stated in the history of present illness. See HPI for details.      Objective     Visit Vitals  /80 (BP Location: Left arm, Patient Position: Sitting, BP Method: Large (Manual))   Pulse 95   Ht 5' 5" (1.651 m)   Wt 132.9 kg (293 lb)   BMI 48.76 kg/m²         Physical Exam  Vitals and nursing note reviewed.   Constitutional:       Appearance: Normal appearance.      Comments: Increased BMI   HENT:      Head: Normocephalic and atraumatic.      Nose: Nose normal. No congestion.   Cardiovascular:      Rate and Rhythm: Normal rate.   Pulmonary:      Effort: Pulmonary effort is normal.   Skin:     Findings: No rash.   Neurological:      Mental Status: She is alert and oriented to person, place, and time.   Psychiatric:         Mood and Affect: Mood normal.         Behavior: Behavior normal.         Thought Content: Thought content normal.         Judgment: Judgment normal.           ASSESSMENT/PLAN:     28 y.o.  female with IUP at 34w5d    Chronic hypertension in pregnancy with low-normal fetal growth  There is low normal fetal growth with continued fetal growth with an EFW of 1896 g at the 14% and the AC at the 18% on 2024.  AFV is normal.  BPP 8/8.    Chronic hypertension complicates up to 2-5% of pregnancies and is associated with significant adverse pregnancy outcomes including  birth, fetal growth restriction, fetal demise, placental abruption, and  delivery.    BP Readings from Last 1 Encounters:   24 132/80     With this BP, with a another reading that was 153/86 she was " advised to continue low salt diet, and adjust labetalol to 600 mg q12 .     Low dose aspirin as discussed.    With low-normal fetal growth, we will plan to recheck fetal growth in 3 weeks and continue twice weekly fetal testing, alternating with primary OB until delivery.  Dr. Gallardo will be seeing the patient early in the week for an NST and will see her later in the week on .        Increased BMI in pregnancy  Body mass index is 48.76 kg/m². With relatively stable weight recently, she was advised to continue healthy low caloric and low salt diet.  Excess weight gain would be associated with gestational hypertension, gestational diabetes and adverse  outcomes, including fetal demise in utero.    With risk factors associated with increased BMI, she is to do fetal kick counts throughout the pregnancy (after 24 weeks).    It is important to lose weight after the pregnancy is over, especially before a future pregnancy was discussed. Breastfeeding may be an important tool in reducing the postpartum weight retention. Fetal risks were discussed with short term risk of fetal/ obesity and long term risk of adolescent component of metabolic syndrome.       Leiomyoma in pregnancy  Right intramural fibroid measuring 5 cm in diameter on 2024, slightly smaller from previous assessment.    Risks of fibroids include  delivery,  PROM, abnormal presentations, higher risk of  sections, placenta previa, fibroid degeneration, poor fetal growth, oligohydramnios, vaginal bleeding and postpartum hemorrhage and in rare circumstances obstruction of lower uterus/cervix, impeding vaginal delivery    She was instructed to report any increased pain, cramps, vaginal discharge or bleeding. Emphasized the importance of monitoring fetal kick count activity, and reporting immediately if decreased fetal movement occurs.    Document postpartum hemorrhage risk on admission to hospital, ensure T&S sent,  and consider type and crossmatch depending on postpartum hemorrhage risk      Absent nasal bone  She already had negative cell free DNA. She declined amniocentesis . She is aware of the small risk of aneuploidy and need for  evaluation.  Additionally, the limitation of ultrasound in checking for anomalies and the need for  evaluation was emphasized.    Patient was advised that in the absence of aneuploidy absent nasal bone could be a familial issue with depressed nasal bridge.      At high risk for preeclampsia  With her increased risk for preeclampsia, she agreed to continue asa 81 mg BID until 34 6/7 weeks, then decrease to once daily until delivery.. Preeclampsia precautions reviewed.    Fetal testing is reassuring.  Blood pressure is labile and bit elevated adjusted the dose of labetalol to 600 mg q.12h.  Patient was advised to continue the aspirin daily for 2 more days and on Saturday change to daily.  Weight is stable.  Fetal kick count instructions.    Follow up in about 4 days (around 2024) for MFM follow-up, BPP.     No future appointments.    MARY involvement: Patient was evaluated by ARMINDA Lakhani and Dr. Blackman.  Final assessment and recommendations as stated above were made by Dr. Blackman.    Components of this note were documented using voice recognition systems and are subject to errors not corrected at proofreading. Please contact the author for any clarifications.

## 2024-01-19 DIAGNOSIS — E66.01 MORBID OBESITY: Primary | ICD-10-CM

## 2024-01-19 DIAGNOSIS — O28.3 ABNORMAL FETAL ULTRASOUND: ICD-10-CM

## 2024-01-19 DIAGNOSIS — O09.90 AT HIGH RISK FOR COMPLICATIONS OF INTRAUTERINE PREGNANCY (IUP): ICD-10-CM

## 2024-01-19 DIAGNOSIS — O10.013 PRE-EXISTING ESSENTIAL HYPERTENSION AFFECTING PREGNANCY IN THIRD TRIMESTER: ICD-10-CM

## 2024-01-19 DIAGNOSIS — O36.5931 LIGHT FOR GESTATIONAL DATES AFFECTING MANAGEMENT OF MOTHER, THIRD TRIMESTER, FETUS 1: ICD-10-CM

## 2024-01-22 ENCOUNTER — OFFICE VISIT (OUTPATIENT)
Dept: MATERNAL FETAL MEDICINE | Facility: CLINIC | Age: 29
End: 2024-01-22
Payer: MEDICAID

## 2024-01-22 ENCOUNTER — PROCEDURE VISIT (OUTPATIENT)
Dept: MATERNAL FETAL MEDICINE | Facility: CLINIC | Age: 29
End: 2024-01-22
Payer: MEDICAID

## 2024-01-22 VITALS
WEIGHT: 292 LBS | BODY MASS INDEX: 48.65 KG/M2 | DIASTOLIC BLOOD PRESSURE: 94 MMHG | SYSTOLIC BLOOD PRESSURE: 146 MMHG | HEIGHT: 65 IN | HEART RATE: 83 BPM

## 2024-01-22 DIAGNOSIS — O09.90 AT HIGH RISK FOR COMPLICATIONS OF INTRAUTERINE PREGNANCY (IUP): Primary | ICD-10-CM

## 2024-01-22 DIAGNOSIS — O28.3 ABNORMAL FETAL ULTRASOUND: ICD-10-CM

## 2024-01-22 DIAGNOSIS — O36.5931 LIGHT FOR GESTATIONAL DATES AFFECTING MANAGEMENT OF MOTHER, THIRD TRIMESTER, FETUS 1: ICD-10-CM

## 2024-01-22 DIAGNOSIS — O34.13 LEIOMYOMA OF UTERUS AFFECTING PREGNANCY IN THIRD TRIMESTER: ICD-10-CM

## 2024-01-22 DIAGNOSIS — E66.01 MORBID OBESITY: ICD-10-CM

## 2024-01-22 DIAGNOSIS — D25.9 LEIOMYOMA OF UTERUS AFFECTING PREGNANCY IN THIRD TRIMESTER: ICD-10-CM

## 2024-01-22 DIAGNOSIS — O10.013 PRE-EXISTING ESSENTIAL HYPERTENSION AFFECTING PREGNANCY IN THIRD TRIMESTER: ICD-10-CM

## 2024-01-22 DIAGNOSIS — O99.213 OBESITY AFFECTING PREGNANCY IN THIRD TRIMESTER, UNSPECIFIED OBESITY TYPE: ICD-10-CM

## 2024-01-22 DIAGNOSIS — O09.90 AT HIGH RISK FOR COMPLICATIONS OF INTRAUTERINE PREGNANCY (IUP): ICD-10-CM

## 2024-01-22 PROCEDURE — 3080F DIAST BP >= 90 MM HG: CPT | Mod: CPTII,S$GLB,, | Performed by: OBSTETRICS & GYNECOLOGY

## 2024-01-22 PROCEDURE — 3008F BODY MASS INDEX DOCD: CPT | Mod: CPTII,S$GLB,, | Performed by: OBSTETRICS & GYNECOLOGY

## 2024-01-22 PROCEDURE — 1159F MED LIST DOCD IN RCRD: CPT | Mod: CPTII,S$GLB,, | Performed by: OBSTETRICS & GYNECOLOGY

## 2024-01-22 PROCEDURE — 76819 FETAL BIOPHYS PROFIL W/O NST: CPT | Mod: S$GLB,,, | Performed by: OBSTETRICS & GYNECOLOGY

## 2024-01-22 PROCEDURE — 3077F SYST BP >= 140 MM HG: CPT | Mod: CPTII,S$GLB,, | Performed by: OBSTETRICS & GYNECOLOGY

## 2024-01-22 PROCEDURE — 99214 OFFICE O/P EST MOD 30 MIN: CPT | Mod: TH,S$GLB,, | Performed by: OBSTETRICS & GYNECOLOGY

## 2024-01-22 NOTE — PROGRESS NOTES
Maternal Fetal Medicine Follow Up    Subjective     Patient ID: [id number]    Chief Complaint: MFM follow up w/us       HPI: Pari Peterson is a 28 y.o. female  at 35w2d gestation with Estimated Date of Delivery: 24  who is here for follow  up consultation by MFM.    She has chronic hypertension, diagnosed earlier in the pregnancy, and is currently on labetalol 600 mg q12 hours.  She is on low-dose aspirin daily.  On 2023, she had 24 hour urine with 132 mg protein.  She has increased BMI of 48 at consult visit.  She had absent fetal nasal bone seen on consult ultrasound and had negative cell free DNA.  She has fibroid uterus.  She had low-lying placenta seen on outside ultrasound, not seen on follow-up and vasa previa was ruled out.  She had low normal fetal growth on previous ultrasound.  Patient is accompanied by her partner       Interval history since last MFM visit: None.. She denies any leaking fluid, vaginal bleeding, contractions, decreased fetal movement. Denies headaches, visual disturbances, or epigastric pain.    Pregnancy complications include:   Patient Active Problem List   Diagnosis    Pre-existing essential hypertension affecting pregnancy in third trimester    Abnormal fetal ultrasound (absent nasal bone)    Leiomyoma of uterus affecting pregnancy in third trimester    Increased BMI affecting pregnancy in third trimester    At high risk for complications of intrauterine pregnancy (IUP)    Light for gestational dates EFW 14%/AC 18% on 2024) affecting management of mother, third trimester, fetus 1    BMI over 45        No changes to medical, surgical, family, social, or obstetric history.    Medications:  Current Outpatient Medications   Medication Instructions    albuterol (PROVENTIL) 2.5 mg /3 mL (0.083 %) nebulizer solution SMARTSI Vial(s) Via Nebulizer Every 8 Hours PRN    albuterol (PROVENTIL/VENTOLIN HFA) 90 mcg/actuation inhaler 1 puff, Every 4 hours    amoxicillin  "(AMOXIL) 500 mg, Oral, 2 times daily    aspirin 81 mg, Oral, Daily    FEROSUL 325 mg (65 mg iron) Tab tablet Oral, 2 times daily    labetaloL (NORMODYNE) 300 mg, Oral, Every 8 hours    labetalol HCl (INV LABETALOL) 200 MG tablet No dose, route, or frequency recorded.    ondansetron (ZOFRAN) 4 mg, Oral, Every 6 hours PRN    prenatal vit/iron fum/folic ac (PRENATAL 1+1 ORAL) Oral       Review of Systems   12 point review of systems conducted, negative except as stated in the history of present illness. See HPI for details.      Objective     Visit Vitals  BP (!) 146/94 (BP Location: Left forearm, Patient Position: Sitting, BP Method: Large (Automatic))   Pulse 83   Ht 5' 5" (1.651 m)   Wt 132.5 kg (292 lb)   BMI 48.59 kg/m²         Physical Exam  Vitals and nursing note reviewed.   Constitutional:       Appearance: Normal appearance.      Comments: Increased BMI   HENT:      Head: Normocephalic and atraumatic.      Nose: Nose normal. No congestion.   Cardiovascular:      Rate and Rhythm: Normal rate.   Pulmonary:      Effort: Pulmonary effort is normal.   Skin:     Findings: No rash.   Neurological:      Mental Status: She is alert and oriented to person, place, and time.   Psychiatric:         Mood and Affect: Mood normal.         Behavior: Behavior normal.         Thought Content: Thought content normal.         Judgment: Judgment normal.         ASSESSMENT/PLAN:     28 y.o.  female with IUP at 35w2d    Chronic hypertension in pregnancy with low-normal fetal growth  There is low normal fetal growth with continued fetal growth with an EFW of 1896 g at the 14% and the AC at the 18% on 2024.  AFV is normal.  BPP .    Chronic hypertension complicates up to 2-5% of pregnancies and is associated with significant adverse pregnancy outcomes including  birth, fetal growth restriction, fetal demise, placental abruption, and  delivery.    BP Readings from Last 1 Encounters:   24 (!) 146/94     " With this BP,  she was advised to continue low salt diet, and adjust labetalol to 600 mg q8 hrs .  Patient has no symptoms she has trace 1+ edema with normal reflexes bilaterally.  There is no signs of superimposed preeclampsia.  Preeclampsia labs do not have to be repeated.  Patient will be seeing Dr. Demetrice Gallardo in 3 days in the office.  Fetal kick count and preeclampsia warnings.    Low dose aspirin as discussed.    With low-normal fetal growth, we will plan to recheck fetal growth next and continue twice weekly fetal testing, alternating with primary OB until delivery.  Dr. Gallardo will be seeing the patient early in the week for an NST and will see her later in the week on .      With chronic hypertension and increased BMI  recommend delivery at no later than 38 weeks' gestation (38-38 6 weeks) as optimal balance between prematurity risks and risks of continued pregnancy. Earlier delivery may be needed for worsening maternal or fetal status.    If blood pressure continues to increase recommend delivery at 37 weeks' gestation although immediate delivery could be done if evidence of superimposed preeclampsia.      Increased BMI in pregnancy  Body mass index is 48.59 kg/m². With relatively stable weight recently, she was advised to continue healthy low caloric and low salt diet.  Excess weight gain would be associated with gestational hypertension, gestational diabetes and adverse  outcomes, including fetal demise in utero.    With risk factors associated with increased BMI, she is to do fetal kick counts throughout the pregnancy (after 24 weeks).    It is important to lose weight after the pregnancy is over, especially before a future pregnancy was discussed. Breastfeeding may be an important tool in reducing the postpartum weight retention. Fetal risks were discussed with short term risk of fetal/ obesity and long term risk of adolescent component of metabolic syndrome.       Leiomyoma  in pregnancy  Right intramural fibroid measuring 5 cm in diameter on 2024, slightly smaller from previous assessment.    Risks of fibroids include  delivery,  PROM, abnormal presentations, higher risk of  sections, placenta previa, fibroid degeneration, poor fetal growth, oligohydramnios, vaginal bleeding and postpartum hemorrhage and in rare circumstances obstruction of lower uterus/cervix, impeding vaginal delivery    She was instructed to report any increased pain, cramps, vaginal discharge or bleeding. Emphasized the importance of monitoring fetal kick count activity, and reporting immediately if decreased fetal movement occurs.    Document postpartum hemorrhage risk on admission to hospital, ensure T&S sent, and consider type and crossmatch depending on postpartum hemorrhage risk      Absent nasal bone  She already had negative cell free DNA. She declined amniocentesis . She is aware of the small risk of aneuploidy and need for  evaluation.  Additionally, the limitation of ultrasound in checking for anomalies and the need for  evaluation was emphasized.    Patient was advised that in the absence of aneuploidy absent nasal bone could be a familial issue with depressed nasal bridge.      At high risk for preeclampsia  With her increased risk for preeclampsia, she will continue daily aspirin 81 mg until delivery.. Preeclampsia precautions reviewed.      Follow up in about 1 week (around 2024) for BPP, MFM follow-up, Repeat  ultrasound.     Future Appointments   Date Time Provider Department Center   2024 11:00 AM Samuel Blackman MD [facility]   2024 11:00 AM ROOM 3, [facility] John HESTER         MARY involvement: Patient was evaluated by ARMINDA Lakhani and Dr. Blackman.  Final assessment and recommendations as stated above were made by Dr. Blackman.    Components of this note were documented using voice recognition systems and are subject to  errors not corrected at proofreading. Please contact the author for any clarifications.

## 2024-01-29 ENCOUNTER — OFFICE VISIT (OUTPATIENT)
Dept: MATERNAL FETAL MEDICINE | Facility: CLINIC | Age: 29
End: 2024-01-29
Payer: MEDICAID

## 2024-01-29 ENCOUNTER — PROCEDURE VISIT (OUTPATIENT)
Dept: MATERNAL FETAL MEDICINE | Facility: CLINIC | Age: 29
End: 2024-01-29
Payer: MEDICAID

## 2024-01-29 VITALS
WEIGHT: 293 LBS | SYSTOLIC BLOOD PRESSURE: 122 MMHG | DIASTOLIC BLOOD PRESSURE: 89 MMHG | BODY MASS INDEX: 48.82 KG/M2 | HEART RATE: 87 BPM | HEIGHT: 65 IN

## 2024-01-29 DIAGNOSIS — O28.3 ABNORMAL FETAL ULTRASOUND: ICD-10-CM

## 2024-01-29 DIAGNOSIS — O09.90 AT HIGH RISK FOR COMPLICATIONS OF INTRAUTERINE PREGNANCY (IUP): ICD-10-CM

## 2024-01-29 DIAGNOSIS — O99.213 OBESITY AFFECTING PREGNANCY IN THIRD TRIMESTER, UNSPECIFIED OBESITY TYPE: ICD-10-CM

## 2024-01-29 DIAGNOSIS — O36.5931 LIGHT FOR GESTATIONAL DATES AFFECTING MANAGEMENT OF MOTHER, THIRD TRIMESTER, FETUS 1: ICD-10-CM

## 2024-01-29 DIAGNOSIS — O10.919 CHRONIC HYPERTENSION AFFECTING PREGNANCY: ICD-10-CM

## 2024-01-29 DIAGNOSIS — O34.13 LEIOMYOMA OF UTERUS AFFECTING PREGNANCY IN THIRD TRIMESTER: ICD-10-CM

## 2024-01-29 DIAGNOSIS — D25.9 LEIOMYOMA OF UTERUS AFFECTING PREGNANCY IN THIRD TRIMESTER: ICD-10-CM

## 2024-01-29 DIAGNOSIS — E66.01 MORBID OBESITY: ICD-10-CM

## 2024-01-29 DIAGNOSIS — O09.90 AT HIGH RISK FOR COMPLICATIONS OF INTRAUTERINE PREGNANCY (IUP): Primary | ICD-10-CM

## 2024-01-29 DIAGNOSIS — O10.013 PRE-EXISTING ESSENTIAL HYPERTENSION AFFECTING PREGNANCY IN THIRD TRIMESTER: ICD-10-CM

## 2024-01-29 PROCEDURE — 99213 OFFICE O/P EST LOW 20 MIN: CPT | Mod: TH,S$GLB,, | Performed by: OBSTETRICS & GYNECOLOGY

## 2024-01-29 PROCEDURE — 76820 UMBILICAL ARTERY ECHO: CPT | Mod: S$GLB,,, | Performed by: OBSTETRICS & GYNECOLOGY

## 2024-01-29 PROCEDURE — 3079F DIAST BP 80-89 MM HG: CPT | Mod: CPTII,S$GLB,, | Performed by: OBSTETRICS & GYNECOLOGY

## 2024-01-29 PROCEDURE — 3008F BODY MASS INDEX DOCD: CPT | Mod: CPTII,S$GLB,, | Performed by: OBSTETRICS & GYNECOLOGY

## 2024-01-29 PROCEDURE — 1159F MED LIST DOCD IN RCRD: CPT | Mod: CPTII,S$GLB,, | Performed by: OBSTETRICS & GYNECOLOGY

## 2024-01-29 PROCEDURE — 76819 FETAL BIOPHYS PROFIL W/O NST: CPT | Mod: S$GLB,,, | Performed by: OBSTETRICS & GYNECOLOGY

## 2024-01-29 PROCEDURE — 3074F SYST BP LT 130 MM HG: CPT | Mod: CPTII,S$GLB,, | Performed by: OBSTETRICS & GYNECOLOGY

## 2024-01-29 PROCEDURE — 76816 OB US FOLLOW-UP PER FETUS: CPT | Mod: S$GLB,,, | Performed by: OBSTETRICS & GYNECOLOGY

## 2024-01-29 RX ORDER — LABETALOL 300 MG/1
300 TABLET, FILM COATED ORAL EVERY 8 HOURS
Qty: 90 TABLET | Refills: 3 | Status: SHIPPED | OUTPATIENT
Start: 2024-01-29 | End: 2024-05-28

## 2024-01-29 NOTE — PROGRESS NOTES
Maternal Fetal Medicine Follow Up    Subjective     Patient ID: [id number]    Chief Complaint: MFM follow up with US (HTN.)      HPI: Pari Peterson is a 28 y.o. female  at 36w2d gestation with Estimated Date of Delivery: 24  who is here for follow  up consultation by MFM.    She has chronic hypertension, diagnosed earlier in the pregnancy, and is currently on labetalol 600 mg q8 hours.  She is on low-dose aspirin daily.  On 2023, she had 24 hour urine with 132 mg protein.  She has increased BMI of 48 at consult visit.  She had absent fetal nasal bone seen on consult ultrasound and had negative cell free DNA.  She has fibroid uterus.  She had low-lying placenta seen on outside ultrasound, not seen on follow-up and vasa previa was ruled out.  She had low normal fetal growth on previous ultrasound.       Interval history since last MFM visit: None.. She denies any leaking fluid, vaginal bleeding, contractions, decreased fetal movement. Denies headaches, visual disturbances, or epigastric pain.    Pregnancy complications include:   Patient Active Problem List   Diagnosis    Pre-existing essential hypertension affecting pregnancy in third trimester    Abnormal fetal ultrasound (absent nasal bone)    Leiomyoma of uterus affecting pregnancy in third trimester    Increased BMI affecting pregnancy in third trimester    At high risk for complications of intrauterine pregnancy (IUP)    Light for gestational dates EFW 14%/AC 18% on 2024) affecting management of mother, third trimester, fetus 1    BMI over 45        No changes to medical, surgical, family, social, or obstetric history.    Medications:  Current Outpatient Medications   Medication Instructions    albuterol (PROVENTIL) 2.5 mg /3 mL (0.083 %) nebulizer solution SMARTSI Vial(s) Via Nebulizer Every 8 Hours PRN    albuterol (PROVENTIL/VENTOLIN HFA) 90 mcg/actuation inhaler 1 puff, Every 4 hours    aspirin 81 mg, Oral, Daily    FEROSUL 325 mg (65  "mg iron) Tab tablet Oral, 2 times daily    labetaloL (NORMODYNE) 300 mg, Oral, Every 8 hours    labetalol HCl (INV LABETALOL) 200 MG tablet No dose, route, or frequency recorded.    ondansetron (ZOFRAN) 4 mg, Oral, Every 6 hours PRN    prenatal vit/iron fum/folic ac (PRENATAL 1+1 ORAL) Oral       Review of Systems   12 point review of systems conducted, negative except as stated in the history of present illness. See HPI for details.      Objective     Visit Vitals  /89 (BP Location: Right arm, Patient Position: Sitting, BP Method: X-Large (Automatic))   Pulse 87   Ht 5' 5" (1.651 m)   Wt 132.9 kg (293 lb)   BMI 48.76 kg/m²         Physical Exam  Vitals and nursing note reviewed.   Constitutional:       Appearance: Normal appearance.      Comments: Increased BMI   HENT:      Head: Normocephalic and atraumatic.      Nose: Nose normal. No congestion.   Cardiovascular:      Rate and Rhythm: Normal rate.   Pulmonary:      Effort: Pulmonary effort is normal.   Skin:     Findings: No rash.   Neurological:      Mental Status: She is alert and oriented to person, place, and time.   Psychiatric:         Mood and Affect: Mood normal.         Behavior: Behavior normal.         Thought Content: Thought content normal.         Judgment: Judgment normal.       ASSESSMENT/PLAN:     28 y.o.  female with IUP at 36w2d    Chronic hypertension in pregnancy with low-normal fetal growth  There is low normal fetal growth with an EFW of 2549 g at the 16% and the AC at the 33% on 2024  AFV is normal.  BPP 8/8.  Doppler study revealed mildly elevated S/D ratio at 3.5.    Chronic hypertension complicates up to 2-5% of pregnancies and is associated with significant adverse pregnancy outcomes including  birth, fetal growth restriction, fetal demise, placental abruption, and  delivery.    BP Readings from Last 1 Encounters:   24 122/89    Initial reading was 160/125 was inaccurate as immediate rechecked " was 122/89.  With this BP,  she was advised to continue low salt diet, and continue labetalol to 600 mg q8 hrs. Fetal kick count and preeclampsia warnings reviewed.    Low dose aspirin as discussed.    Will continue twice weekly fetal testing, alternating with primary OB until delivery.  Dr. Gallardo will be seeing the patient early in the week for an NST and will see her later in the week on .      With chronic hypertension and increased BMI  recommend delivery at no later than 38 weeks' gestation (38-38 6/ weeks) as optimal balance between prematurity risks and risks of continued pregnancy. Earlier delivery may be needed for worsening maternal or fetal status.    If blood pressure continues to increase recommend delivery at 37 weeks' gestation although immediate delivery could be done if evidence of superimposed preeclampsia.      Increased BMI in pregnancy  Body mass index is 48.76 kg/m². With relatively stable weight recently, she was advised to continue healthy low caloric and low salt diet.  Excess weight gain would be associated with gestational hypertension, gestational diabetes and adverse  outcomes, including fetal demise in utero.    With risk factors associated with increased BMI, she is to do fetal kick counts throughout the pregnancy (after 24 weeks).    It is important to lose weight after the pregnancy is over, especially before a future pregnancy was discussed. Breastfeeding may be an important tool in reducing the postpartum weight retention. Fetal risks were discussed with short term risk of fetal/ obesity and long term risk of adolescent component of metabolic syndrome.       Leiomyoma in pregnancy  Right intramural fibroid measuring 6.3 cm in diameter on 2024, slightly smaller from previous assessment.    Risks of fibroids include  delivery,  PROM, abnormal presentations, higher risk of  sections, placenta previa, fibroid degeneration, poor  fetal growth, oligohydramnios, vaginal bleeding and postpartum hemorrhage and in rare circumstances obstruction of lower uterus/cervix, impeding vaginal delivery    She was instructed to report any increased pain, cramps, vaginal discharge or bleeding. Emphasized the importance of monitoring fetal kick count activity, and reporting immediately if decreased fetal movement occurs.    Document postpartum hemorrhage risk on admission to hospital, ensure T&S sent, and consider type and crossmatch depending on postpartum hemorrhage risk      Absent nasal bone  She already had negative cell free DNA. She declined amniocentesis . She is aware of the small risk of aneuploidy and need for  evaluation.  Additionally, the limitation of ultrasound in checking for anomalies and the need for  evaluation was emphasized.    Patient was advised that in the absence of aneuploidy absent nasal bone could be a familial issue with depressed nasal bridge.      At high risk for preeclampsia  With her increased risk for preeclampsia, she will continue daily aspirin 81 mg until delivery.. Preeclampsia precautions reviewed.      Follow up in about 1 week (around 2024) for MFM follow-up, BPP and umbilical artery Doppler.     No future appointments.    MARY involvement: Patient was evaluated and examined by Dr. Blackman. ARMINDA Lakhani, helped in pre charting of part of note.    This note was created with the assistance of [employer] voice recognition software. There may be transcription errors as a result of using this technology, however minimal. Effort has been made to assure accuracy of transcription, but any obvious errors or omissions should be clarified with the author of the document.

## 2024-02-02 DIAGNOSIS — O09.90 AT HIGH RISK FOR COMPLICATIONS OF INTRAUTERINE PREGNANCY (IUP): Primary | ICD-10-CM

## 2024-02-02 DIAGNOSIS — O10.013 PRE-EXISTING ESSENTIAL HYPERTENSION AFFECTING PREGNANCY IN THIRD TRIMESTER: ICD-10-CM

## 2024-02-02 DIAGNOSIS — O28.3 ABNORMAL FETAL ULTRASOUND: ICD-10-CM

## 2024-02-02 DIAGNOSIS — E66.01 MORBID OBESITY: ICD-10-CM

## 2024-02-05 ENCOUNTER — PROCEDURE VISIT (OUTPATIENT)
Dept: MATERNAL FETAL MEDICINE | Facility: CLINIC | Age: 29
End: 2024-02-05
Payer: MEDICAID

## 2024-02-05 ENCOUNTER — OFFICE VISIT (OUTPATIENT)
Dept: MATERNAL FETAL MEDICINE | Facility: CLINIC | Age: 29
End: 2024-02-05
Payer: MEDICAID

## 2024-02-05 VITALS
HEIGHT: 65 IN | WEIGHT: 293 LBS | HEART RATE: 96 BPM | SYSTOLIC BLOOD PRESSURE: 119 MMHG | BODY MASS INDEX: 48.82 KG/M2 | DIASTOLIC BLOOD PRESSURE: 96 MMHG

## 2024-02-05 DIAGNOSIS — E66.01 MORBID OBESITY: ICD-10-CM

## 2024-02-05 DIAGNOSIS — O10.013 PRE-EXISTING ESSENTIAL HYPERTENSION AFFECTING PREGNANCY IN THIRD TRIMESTER: ICD-10-CM

## 2024-02-05 DIAGNOSIS — O09.90 AT HIGH RISK FOR COMPLICATIONS OF INTRAUTERINE PREGNANCY (IUP): ICD-10-CM

## 2024-02-05 DIAGNOSIS — O09.90 AT HIGH RISK FOR COMPLICATIONS OF INTRAUTERINE PREGNANCY (IUP): Primary | ICD-10-CM

## 2024-02-05 DIAGNOSIS — O36.5931 LIGHT FOR GESTATIONAL DATES AFFECTING MANAGEMENT OF MOTHER, THIRD TRIMESTER, FETUS 1: ICD-10-CM

## 2024-02-05 DIAGNOSIS — D25.9 LEIOMYOMA OF UTERUS AFFECTING PREGNANCY IN THIRD TRIMESTER: ICD-10-CM

## 2024-02-05 DIAGNOSIS — O28.3 ABNORMAL FETAL ULTRASOUND: ICD-10-CM

## 2024-02-05 DIAGNOSIS — O99.213 OBESITY AFFECTING PREGNANCY IN THIRD TRIMESTER, UNSPECIFIED OBESITY TYPE: ICD-10-CM

## 2024-02-05 DIAGNOSIS — O34.13 LEIOMYOMA OF UTERUS AFFECTING PREGNANCY IN THIRD TRIMESTER: ICD-10-CM

## 2024-02-05 PROCEDURE — 3080F DIAST BP >= 90 MM HG: CPT | Mod: CPTII,S$GLB,, | Performed by: OBSTETRICS & GYNECOLOGY

## 2024-02-05 PROCEDURE — 76819 FETAL BIOPHYS PROFIL W/O NST: CPT | Mod: S$GLB,,, | Performed by: OBSTETRICS & GYNECOLOGY

## 2024-02-05 PROCEDURE — 3074F SYST BP LT 130 MM HG: CPT | Mod: CPTII,S$GLB,, | Performed by: OBSTETRICS & GYNECOLOGY

## 2024-02-05 PROCEDURE — 76820 UMBILICAL ARTERY ECHO: CPT | Mod: S$GLB,,, | Performed by: OBSTETRICS & GYNECOLOGY

## 2024-02-05 PROCEDURE — 1159F MED LIST DOCD IN RCRD: CPT | Mod: CPTII,S$GLB,, | Performed by: OBSTETRICS & GYNECOLOGY

## 2024-02-05 PROCEDURE — 99214 OFFICE O/P EST MOD 30 MIN: CPT | Mod: TH,S$GLB,, | Performed by: OBSTETRICS & GYNECOLOGY

## 2024-02-05 PROCEDURE — 3008F BODY MASS INDEX DOCD: CPT | Mod: CPTII,S$GLB,, | Performed by: OBSTETRICS & GYNECOLOGY

## 2024-02-05 RX ORDER — AMOXICILLIN 500 MG/1
500 CAPSULE ORAL 2 TIMES DAILY
COMMUNITY
Start: 2024-02-01

## 2024-02-05 NOTE — PROGRESS NOTES
Maternal Fetal Medicine Follow Up    Subjective     Patient ID: [id number]    Chief Complaint: M follow up with US (K[facility]STAL[facility].)      HPI: Pari Peterson is a 28 y.o. female  at 37w2d gestation with Estimated Date of Delivery: 24  who is here for follow  up consultation by MFM.    She has chronic hypertension, diagnosed earlier in the pregnancy, and is currently on labetalol 600 mg q8 hours.  She is on low-dose aspirin daily.  On 2023, she had 24 hour urine with 132 mg protein.  She has increased BMI of 48 at consult visit.  She had absent fetal nasal bone seen on consult ultrasound and had negative cell free DNA.  She has fibroid uterus.  She had low-lying placenta seen on outside ultrasound, not seen on follow-up and vasa previa was ruled out.  She had low normal fetal growth on previous ultrasound and elevated S/D ratio on umbilical artery Doppler on last ultrasound.         Interval history since last MFM visit: None.. She denies any leaking fluid, vaginal bleeding, contractions, decreased fetal movement. Denies headaches, visual disturbances, or epigastric pain.    Pregnancy complications include:   Patient Active Problem List   Diagnosis    Pre-existing essential hypertension affecting pregnancy in third trimester    Abnormal fetal ultrasound (absent nasal bone)    Leiomyoma of uterus affecting pregnancy in third trimester    Increased BMI affecting pregnancy in third trimester    At high risk for complications of intrauterine pregnancy (IUP)    Light for gestational dates EFW 14%/AC 18% on 2024) affecting management of mother, third trimester, fetus 1    BMI over 45        No changes to medical, surgical, family, social, or obstetric history.    Medications:  Current Outpatient Medications   Medication Instructions    albuterol (PROVENTIL) 2.5 mg /3 mL (0.083 %) nebulizer solution SMARTSI Vial(s) Via Nebulizer Every 8 Hours PRN    albuterol (PROVENTIL/VENTOLIN HFA) 90 mcg/actuation inhaler 1  "puff, Every 4 hours    amoxicillin (AMOXIL) 500 mg, Oral, 2 times daily    aspirin 81 mg, Oral, Daily    FEROSUL 325 mg (65 mg iron) Tab tablet Oral, 2 times daily    labetaloL (NORMODYNE) 300 mg, Oral, Every 8 hours    labetalol HCl (INV LABETALOL) 200 MG tablet No dose, route, or frequency recorded.    ondansetron (ZOFRAN) 4 mg, Oral, Every 6 hours PRN    prenatal vit/iron fum/folic ac (PRENATAL 1+1 ORAL) Oral       Review of Systems   12 point review of systems conducted, negative except as stated in the history of present illness. See HPI for details.      Objective     Visit Vitals  BP (!) 119/96 (BP Location: Right arm, Patient Position: Sitting, BP Method: X-Large (Automatic))   Pulse 96   Ht 5' 5" (1.651 m)   Wt 134.3 kg (296 lb)   BMI 49.26 kg/m²         Physical Exam  Vitals and nursing note reviewed.   Constitutional:       Appearance: Normal appearance.      Comments: Increased BMI   HENT:      Head: Normocephalic and atraumatic.      Nose: Nose normal. No congestion.   Cardiovascular:      Rate and Rhythm: Normal rate.   Pulmonary:      Effort: Pulmonary effort is normal.   Skin:     Findings: No rash.   Neurological:      Mental Status: She is alert and oriented to person, place, and time.   Psychiatric:         Mood and Affect: Mood normal.         Behavior: Behavior normal.         Thought Content: Thought content normal.         Judgment: Judgment normal.         ASSESSMENT/PLAN:     28 y.o.  female with IUP at 37w2d    Chronic hypertension in pregnancy   There is low normal fetal growth with an EFW of 2549 g at the 16% and the AC at the 33% on 2024.  AFV is normal.  BPP 8/8.    Umbilical artery Doppler is normal today.    Chronic hypertension complicates up to 2-5% of pregnancies and is associated with significant adverse pregnancy outcomes including  birth, fetal growth restriction, fetal demise, placental abruption, and  delivery.    BP Readings from Last 1 Encounters: "   24 (!) 119/96   With this BP,  and an additional reading of 150/105, she was advised to continue low salt diet, and adjust labetalol to 800 mg q8 hrs.  Patient failed a new prescription with 300 mg tablets where she is taking 2 tablets 3 times a day and might not be able to fill a new prescription.  Because of the the advised her that she could take 3 tablets of those in the morning 3 tablets in the evening and 2 tablets in the afternoon, for a total of 2400 mg daily.  Fetal kick count and preeclampsia warnings reviewed.    Low dose aspirin as discussed.     With chronic hypertension on high dose of medication with frequent adjustments, and increased BMI,  recommend delivery at 37 weeks' gestation (37-37 6/7th weeks) as optimal balance between prematurity risks and risks of continued pregnancy. Earlier delivery may be needed for worsening maternal or fetal status.    Discussed with Dr. Gallardo who will see the patient tomorrow and schedule the induction of labor this week.  Preeclampsia warnings fetal kick count instructions.  Patient is aware of the risks of prematurity that is outweighed by the risk of continued pregnancy in this setting and she is in agreement with plan of care.  The alternative of waiting to 38 weeks that would have less prematurity complications but more risks from continued pregnancy to both mom and baby and patient is in agreement with delivery at this gestational age this week.      Increased BMI in pregnancy  Body mass index is 49.26 kg/m². With relatively stable weight recently, she was advised to continue healthy low caloric and low salt diet.  Excess weight gain would be associated with gestational hypertension, gestational diabetes and adverse  outcomes, including fetal demise in utero.    With risk factors associated with increased BMI, she is to do fetal kick counts throughout the pregnancy (after 24 weeks).    It is important to lose weight after the pregnancy is over,  especially before a future pregnancy was discussed. Breastfeeding may be an important tool in reducing the postpartum weight retention. Fetal risks were discussed with short term risk of fetal/ obesity and long term risk of adolescent component of metabolic syndrome.       Leiomyoma in pregnancy  Right intramural fibroid measuring 6.3 cm in diameter on 2024, slightly smaller from previous assessment.    Risks of fibroids include  delivery,  PROM, abnormal presentations, higher risk of  sections, placenta previa, fibroid degeneration, poor fetal growth, oligohydramnios, vaginal bleeding and postpartum hemorrhage and in rare circumstances obstruction of lower uterus/cervix, impeding vaginal delivery    She was instructed to report any increased pain, cramps, vaginal discharge or bleeding. Emphasized the importance of monitoring fetal kick count activity, and reporting immediately if decreased fetal movement occurs.    Document postpartum hemorrhage risk on admission to hospital, ensure T&S sent, and consider type and crossmatch depending on postpartum hemorrhage risk      Absent nasal bone  She already had negative cell free DNA. She declined amniocentesis . She is aware of the small risk of aneuploidy and need for  evaluation.  Additionally, the limitation of ultrasound in checking for anomalies and the need for  evaluation was emphasized.    Patient was advised that in the absence of aneuploidy absent nasal bone could be a familial issue with depressed nasal bridge.      At high risk for preeclampsia  With her increased risk for preeclampsia, she will continue daily aspirin 81 mg until delivery.. Preeclampsia precautions reviewed.      Follow up for None. Keep follow up with primary OB.     No future appointments.      MARY involvement: Patient was evaluated and examined by Dr. Blackman. ARMINDA Lakhani, helped as  scribe in completion of part of note.    This note was  created with the assistance of [employer] voice recognition software. There may be transcription errors as a result of using this technology, however minimal. Effort has been made to assure accuracy of transcription, but any obvious errors or omissions should be clarified with the author of the document.

## 2025-04-02 ENCOUNTER — RESULTS FOLLOW-UP (OUTPATIENT)
Dept: OBSTETRICS AND GYNECOLOGY | Facility: HOSPITAL | Age: 30
End: 2025-04-02

## 2025-04-02 ENCOUNTER — LAB VISIT (OUTPATIENT)
Dept: LAB | Facility: HOSPITAL | Age: 30
End: 2025-04-02
Attending: STUDENT IN AN ORGANIZED HEALTH CARE EDUCATION/TRAINING PROGRAM
Payer: MEDICAID

## 2025-04-02 DIAGNOSIS — O13.1 GESTATIONAL HYPERTENSION WITHOUT SIGNIFICANT PROTEINURIA IN FIRST TRIMESTER: Primary | ICD-10-CM

## 2025-04-02 DIAGNOSIS — Z34.81 PRENATAL CARE, SUBSEQUENT PREGNANCY, FIRST TRIMESTER: ICD-10-CM

## 2025-04-02 PROBLEM — O99.213 OBESITY AFFECTING PREGNANCY IN THIRD TRIMESTER: Status: RESOLVED | Noted: 2023-10-04 | Resolved: 2025-04-02

## 2025-04-02 PROBLEM — Z98.891 HISTORY OF CESAREAN DELIVERY: Status: ACTIVE | Noted: 2025-04-02

## 2025-04-02 PROBLEM — O28.3 ABNORMAL FETAL ULTRASOUND: Status: RESOLVED | Noted: 2023-10-04 | Resolved: 2025-04-02

## 2025-04-02 PROBLEM — O36.5931 LIGHT FOR GESTATIONAL DATES AFFECTING MANAGEMENT OF MOTHER, THIRD TRIMESTER, FETUS 1: Status: RESOLVED | Noted: 2023-12-20 | Resolved: 2025-04-02

## 2025-04-02 LAB
ALBUMIN SERPL-MCNC: 3.5 G/DL (ref 3.5–5)
ALBUMIN/GLOB SERPL: 1 RATIO (ref 1.1–2)
ALP SERPL-CCNC: 62 UNIT/L (ref 40–150)
ALT SERPL-CCNC: 26 UNIT/L (ref 0–55)
ANION GAP SERPL CALC-SCNC: 6 MEQ/L
AST SERPL-CCNC: 29 UNIT/L (ref 11–45)
BASOPHILS # BLD AUTO: 0.04 X10(3)/MCL
BASOPHILS NFR BLD AUTO: 0.4 %
BILIRUB SERPL-MCNC: 0.3 MG/DL
BUN SERPL-MCNC: 5.1 MG/DL (ref 7–18.7)
CALCIUM SERPL-MCNC: 9.2 MG/DL (ref 8.4–10.2)
CHLORIDE SERPL-SCNC: 103 MMOL/L (ref 98–107)
CO2 SERPL-SCNC: 26 MMOL/L (ref 22–29)
CREAT SERPL-MCNC: 0.74 MG/DL (ref 0.55–1.02)
CREAT/UREA NIT SERPL: 7
EOSINOPHIL # BLD AUTO: 0.21 X10(3)/MCL (ref 0–0.9)
EOSINOPHIL NFR BLD AUTO: 2 %
ERYTHROCYTE [DISTWIDTH] IN BLOOD BY AUTOMATED COUNT: 15.5 % (ref 11.5–17)
GFR SERPLBLD CREATININE-BSD FMLA CKD-EPI: >60 ML/MIN/1.73/M2
GLOBULIN SER-MCNC: 3.6 GM/DL (ref 2.4–3.5)
GLUCOSE 1H P 100 G GLC PO SERPL-MCNC: 137 MG/DL (ref 100–180)
GLUCOSE SERPL-MCNC: 137 MG/DL (ref 74–100)
GROUP & RH: NORMAL
HBV SURFACE AG SERPL QL IA: NONREACTIVE
HCT VFR BLD AUTO: 37.3 % (ref 37–47)
HCV AB SERPL QL IA: NONREACTIVE
HGB BLD-MCNC: 11.8 G/DL (ref 12–16)
HIV 1+2 AB+HIV1 P24 AG SERPL QL IA: NONREACTIVE
IMM GRANULOCYTES # BLD AUTO: 0.07 X10(3)/MCL (ref 0–0.04)
IMM GRANULOCYTES NFR BLD AUTO: 0.7 %
INDIRECT COOMBS: NORMAL
LDH SERPL-CCNC: 193 U/L (ref 125–220)
LYMPHOCYTES # BLD AUTO: 2.16 X10(3)/MCL (ref 0.6–4.6)
LYMPHOCYTES NFR BLD AUTO: 21 %
MCH RBC QN AUTO: 27.7 PG (ref 27–31)
MCHC RBC AUTO-ENTMCNC: 31.6 G/DL (ref 33–36)
MCV RBC AUTO: 87.6 FL (ref 80–94)
MONOCYTES # BLD AUTO: 0.46 X10(3)/MCL (ref 0.1–1.3)
MONOCYTES NFR BLD AUTO: 4.5 %
NEUTROPHILS # BLD AUTO: 7.37 X10(3)/MCL (ref 2.1–9.2)
NEUTROPHILS NFR BLD AUTO: 71.4 %
NRBC BLD AUTO-RTO: 0 %
PLATELET # BLD AUTO: 292 X10(3)/MCL (ref 130–400)
PMV BLD AUTO: 10.1 FL (ref 7.4–10.4)
POTASSIUM SERPL-SCNC: 4.1 MMOL/L (ref 3.5–5.1)
PROT SERPL-MCNC: 7.1 GM/DL (ref 6.4–8.3)
RBC # BLD AUTO: 4.26 X10(6)/MCL (ref 4.2–5.4)
SODIUM SERPL-SCNC: 135 MMOL/L (ref 136–145)
SPECIMEN OUTDATE: NORMAL
T PALLIDUM AB SER QL: NONREACTIVE
URATE SERPL-MCNC: 4.6 MG/DL (ref 2.6–6)
WBC # BLD AUTO: 10.31 X10(3)/MCL (ref 4.5–11.5)

## 2025-04-02 PROCEDURE — 85025 COMPLETE CBC W/AUTO DIFF WBC: CPT

## 2025-04-02 PROCEDURE — 86762 RUBELLA ANTIBODY: CPT

## 2025-04-02 PROCEDURE — 86780 TREPONEMA PALLIDUM: CPT

## 2025-04-02 PROCEDURE — 84550 ASSAY OF BLOOD/URIC ACID: CPT

## 2025-04-02 PROCEDURE — 80053 COMPREHEN METABOLIC PANEL: CPT

## 2025-04-02 PROCEDURE — 36415 COLL VENOUS BLD VENIPUNCTURE: CPT

## 2025-04-02 PROCEDURE — 87340 HEPATITIS B SURFACE AG IA: CPT

## 2025-04-02 PROCEDURE — 86900 BLOOD TYPING SEROLOGIC ABO: CPT | Performed by: STUDENT IN AN ORGANIZED HEALTH CARE EDUCATION/TRAINING PROGRAM

## 2025-04-02 PROCEDURE — 83615 LACTATE (LD) (LDH) ENZYME: CPT

## 2025-04-02 PROCEDURE — 86803 HEPATITIS C AB TEST: CPT

## 2025-04-02 PROCEDURE — 87389 HIV-1 AG W/HIV-1&-2 AB AG IA: CPT

## 2025-04-02 PROCEDURE — 82950 GLUCOSE TEST: CPT

## 2025-04-04 ENCOUNTER — LAB VISIT (OUTPATIENT)
Dept: LAB | Facility: HOSPITAL | Age: 30
End: 2025-04-04
Attending: STUDENT IN AN ORGANIZED HEALTH CARE EDUCATION/TRAINING PROGRAM
Payer: MEDICAID

## 2025-04-04 DIAGNOSIS — O13.1 GESTATIONAL HYPERTENSION WITHOUT SIGNIFICANT PROTEINURIA IN FIRST TRIMESTER: ICD-10-CM

## 2025-04-04 DIAGNOSIS — Z34.81 PRENATAL CARE, SUBSEQUENT PREGNANCY, FIRST TRIMESTER: Primary | ICD-10-CM

## 2025-04-04 LAB
PROT 24H UR-MCNC: NORMAL G/DL
PROT UR STRIP-MCNC: <6.8 MG/DL
RUBV IGG SERPL IA-ACNC: 0.9
RUBV IGG SERPL QL IA: NORMAL
TOTAL VOLUME  (OHS): 2900 ML

## 2025-04-04 PROCEDURE — 84156 ASSAY OF PROTEIN URINE: CPT

## 2025-07-25 ENCOUNTER — LAB VISIT (OUTPATIENT)
Dept: LAB | Facility: HOSPITAL | Age: 30
End: 2025-07-25
Attending: STUDENT IN AN ORGANIZED HEALTH CARE EDUCATION/TRAINING PROGRAM
Payer: MEDICAID

## 2025-07-25 DIAGNOSIS — Z34.82 PRENATAL CARE, SUBSEQUENT PREGNANCY, SECOND TRIMESTER: Primary | ICD-10-CM

## 2025-07-25 LAB
BASOPHILS # BLD AUTO: 0.07 X10(3)/MCL
BASOPHILS NFR BLD AUTO: 0.6 %
EOSINOPHIL # BLD AUTO: 0.35 X10(3)/MCL (ref 0–0.9)
EOSINOPHIL NFR BLD AUTO: 3.1 %
ERYTHROCYTE [DISTWIDTH] IN BLOOD BY AUTOMATED COUNT: 15 % (ref 11.5–17)
GLUCOSE 1H P 100 G GLC PO SERPL-MCNC: 157 MG/DL (ref 100–180)
GROUP & RH: NORMAL
HCT VFR BLD AUTO: 34.4 % (ref 37–47)
HGB BLD-MCNC: 11 G/DL (ref 12–16)
IMM GRANULOCYTES # BLD AUTO: 0.08 X10(3)/MCL (ref 0–0.04)
IMM GRANULOCYTES NFR BLD AUTO: 0.7 %
INDIRECT COOMBS: NORMAL
LYMPHOCYTES # BLD AUTO: 2.12 X10(3)/MCL (ref 0.6–4.6)
LYMPHOCYTES NFR BLD AUTO: 18.5 %
MCH RBC QN AUTO: 28.1 PG (ref 27–31)
MCHC RBC AUTO-ENTMCNC: 32 G/DL (ref 33–36)
MCV RBC AUTO: 88 FL (ref 80–94)
MONOCYTES # BLD AUTO: 0.55 X10(3)/MCL (ref 0.1–1.3)
MONOCYTES NFR BLD AUTO: 4.8 %
NEUTROPHILS # BLD AUTO: 8.29 X10(3)/MCL (ref 2.1–9.2)
NEUTROPHILS NFR BLD AUTO: 72.3 %
NRBC BLD AUTO-RTO: 0 %
PLATELET # BLD AUTO: 254 X10(3)/MCL (ref 130–400)
PMV BLD AUTO: 10.2 FL (ref 7.4–10.4)
RBC # BLD AUTO: 3.91 X10(6)/MCL (ref 4.2–5.4)
SPECIMEN OUTDATE: NORMAL
T PALLIDUM AB SER QL: NONREACTIVE
WBC # BLD AUTO: 11.46 X10(3)/MCL (ref 4.5–11.5)

## 2025-07-25 PROCEDURE — 86901 BLOOD TYPING SEROLOGIC RH(D): CPT | Performed by: STUDENT IN AN ORGANIZED HEALTH CARE EDUCATION/TRAINING PROGRAM

## 2025-07-25 PROCEDURE — 36415 COLL VENOUS BLD VENIPUNCTURE: CPT

## 2025-07-25 PROCEDURE — 85025 COMPLETE CBC W/AUTO DIFF WBC: CPT

## 2025-07-25 PROCEDURE — 82950 GLUCOSE TEST: CPT

## 2025-07-25 PROCEDURE — 87389 HIV-1 AG W/HIV-1&-2 AB AG IA: CPT

## 2025-07-25 PROCEDURE — 86780 TREPONEMA PALLIDUM: CPT

## 2025-07-26 LAB — HIV 1+2 AB+HIV1 P24 AG SERPL QL IA: NONREACTIVE
